# Patient Record
Sex: MALE | Race: WHITE | URBAN - METROPOLITAN AREA
[De-identification: names, ages, dates, MRNs, and addresses within clinical notes are randomized per-mention and may not be internally consistent; named-entity substitution may affect disease eponyms.]

---

## 2017-07-03 ENCOUNTER — APPOINTMENT (RX ONLY)
Dept: URBAN - METROPOLITAN AREA CLINIC 142 | Facility: CLINIC | Age: 82
Setting detail: DERMATOLOGY
End: 2017-07-03

## 2017-07-03 DIAGNOSIS — D18.0 HEMANGIOMA: ICD-10-CM

## 2017-07-03 DIAGNOSIS — L85.3 XEROSIS CUTIS: ICD-10-CM

## 2017-07-03 DIAGNOSIS — L57.0 ACTINIC KERATOSIS: ICD-10-CM

## 2017-07-03 DIAGNOSIS — L72.0 EPIDERMAL CYST: ICD-10-CM

## 2017-07-03 DIAGNOSIS — L82.1 OTHER SEBORRHEIC KERATOSIS: ICD-10-CM

## 2017-07-03 DIAGNOSIS — L57.8 OTHER SKIN CHANGES DUE TO CHRONIC EXPOSURE TO NONIONIZING RADIATION: ICD-10-CM

## 2017-07-03 DIAGNOSIS — Z85.828 PERSONAL HISTORY OF OTHER MALIGNANT NEOPLASM OF SKIN: ICD-10-CM

## 2017-07-03 DIAGNOSIS — D22 MELANOCYTIC NEVI: ICD-10-CM

## 2017-07-03 PROBLEM — D18.01 HEMANGIOMA OF SKIN AND SUBCUTANEOUS TISSUE: Status: ACTIVE | Noted: 2017-07-03

## 2017-07-03 PROBLEM — E78.5 HYPERLIPIDEMIA, UNSPECIFIED: Status: ACTIVE | Noted: 2017-07-03

## 2017-07-03 PROBLEM — I10 ESSENTIAL (PRIMARY) HYPERTENSION: Status: ACTIVE | Noted: 2017-07-03

## 2017-07-03 PROBLEM — D22.5 MELANOCYTIC NEVI OF TRUNK: Status: ACTIVE | Noted: 2017-07-03

## 2017-07-03 PROCEDURE — 17004 DESTROY PREMAL LESIONS 15/>: CPT

## 2017-07-03 PROCEDURE — 99203 OFFICE O/P NEW LOW 30 MIN: CPT | Mod: 25

## 2017-07-03 PROCEDURE — ? LIQUID NITROGEN

## 2017-07-03 PROCEDURE — ? COUNSELING

## 2017-07-03 PROCEDURE — ? DEFER

## 2017-07-03 ASSESSMENT — LOCATION SIMPLE DESCRIPTION DERM
LOCATION SIMPLE: RIGHT PRETIBIAL REGION
LOCATION SIMPLE: UPPER BACK
LOCATION SIMPLE: LEFT CHEEK
LOCATION SIMPLE: LEFT UPPER BACK
LOCATION SIMPLE: RIGHT EAR
LOCATION SIMPLE: SCALP
LOCATION SIMPLE: RIGHT CHEEK

## 2017-07-03 ASSESSMENT — LOCATION DETAILED DESCRIPTION DERM
LOCATION DETAILED: RIGHT SUPERIOR CRUS OF ANTIHELIX
LOCATION DETAILED: RIGHT PROXIMAL PRETIBIAL REGION
LOCATION DETAILED: LEFT MID-UPPER BACK
LOCATION DETAILED: RIGHT CENTRAL MALAR CHEEK
LOCATION DETAILED: SUPERIOR THORACIC SPINE
LOCATION DETAILED: LEFT SUPERIOR UPPER BACK
LOCATION DETAILED: LEFT SUPERIOR PARIETAL SCALP
LOCATION DETAILED: LEFT LATERAL MALAR CHEEK

## 2017-07-03 ASSESSMENT — LOCATION ZONE DERM
LOCATION ZONE: FACE
LOCATION ZONE: LEG
LOCATION ZONE: SCALP
LOCATION ZONE: TRUNK
LOCATION ZONE: EAR

## 2017-07-03 ASSESSMENT — PAIN INTENSITY VAS
HOW INTENSE IS YOUR PAIN 0 BEING NO PAIN, 10 BEING THE MOST SEVERE PAIN POSSIBLE?: 1/10 PAIN
HOW INTENSE IS YOUR PAIN 0 BEING NO PAIN, 10 BEING THE MOST SEVERE PAIN POSSIBLE?: NO PAIN

## 2017-07-03 NOTE — PROCEDURE: LIQUID NITROGEN
Number Of Freeze-Thaw Cycles: 2 freeze-thaw cycles
Consent: The patient's verbal consent was obtained including but not limited to risks of crusting, scabbing, blistering, scarring, darker or lighter pigmentary change, recurrence, incomplete removal and infection.
Render Post-Care Instructions In Note?: no
Total Number Of Aks Treated: 20
Duration Of Freeze Thaw-Cycle (Seconds): 5
Detail Level: Zone
Post-Care Instructions: I reviewed with the patient in detail post-care instructions. Patient is to wear sunprotection, and avoid picking at any of the treated lesions. Pt may apply Vaseline to crusted or scabbing areas.

## 2017-08-14 ENCOUNTER — APPOINTMENT (RX ONLY)
Dept: URBAN - METROPOLITAN AREA CLINIC 142 | Facility: CLINIC | Age: 82
Setting detail: DERMATOLOGY
End: 2017-08-14

## 2017-08-14 VITALS
DIASTOLIC BLOOD PRESSURE: 73 MMHG | HEIGHT: 76 IN | HEART RATE: 72 BPM | SYSTOLIC BLOOD PRESSURE: 143 MMHG | WEIGHT: 165 LBS

## 2017-08-14 DIAGNOSIS — L72.0 EPIDERMAL CYST: ICD-10-CM

## 2017-08-14 PROBLEM — D48.5 NEOPLASM OF UNCERTAIN BEHAVIOR OF SKIN: Status: ACTIVE | Noted: 2017-08-14

## 2017-08-14 PROCEDURE — ? EXCISION

## 2017-08-14 PROCEDURE — 11402 EXC TR-EXT B9+MARG 1.1-2 CM: CPT

## 2017-08-14 PROCEDURE — 13101 CMPLX RPR TRUNK 2.6-7.5 CM: CPT

## 2017-08-14 ASSESSMENT — LOCATION DETAILED DESCRIPTION DERM: LOCATION DETAILED: SUPERIOR THORACIC SPINE

## 2017-08-14 ASSESSMENT — LOCATION ZONE DERM: LOCATION ZONE: TRUNK

## 2017-08-14 ASSESSMENT — LOCATION SIMPLE DESCRIPTION DERM: LOCATION SIMPLE: UPPER BACK

## 2017-08-14 NOTE — PROCEDURE: EXCISION
Skin Substitute Units (Will Override Primary Defect Units If Greater Than 0): 0
Paramedian Forehead Flap Text: A decision was made to reconstruct the defect utilizing an interpolation axial flap and a staged reconstruction.  A telfa template was made of the defect.  This telfa template was then used to outline the paramedian forehead pedicle flap.  The donor area for the pedicle flap was then injected with anesthesia.  The flap was excised through the skin and subcutaneous tissue down to the layer of the underlying musculature.  The pedicle flap was carefully excised within this deep plane to maintain its blood supply.  The edges of the donor site were undermined.   The donor site was closed in a primary fashion.  The pedicle was then rotated into position and sutured.  Once the tube was sutured into place, adequate blood supply was confirmed with blanching and refill.  The pedicle was then wrapped with xeroform gauze and dressed appropriately with a telfa and gauze bandage to ensure continued blood supply and protect the attached pedicle.
Island Pedicle Flap With Canthal Suspension Text: The defect edges were debeveled with a #15 scalpel blade.  Given the location of the defect, shape of the defect and the proximity to free margins an island pedicle advancement flap was deemed most appropriate.  Using a sterile surgical marker, an appropriate advancement flap was drawn incorporating the defect, outlining the appropriate donor tissue and placing the expected incisions within the relaxed skin tension lines where possible. The area thus outlined was incised deep to adipose tissue with a #15 scalpel blade.  The skin margins were undermined to an appropriate distance in all directions around the primary defect and laterally outward around the island pedicle utilizing iris scissors.  There was minimal undermining beneath the pedicle flap. A suspension suture was placed in the canthal tendon to prevent tension and prevent ectropion.
X Size Of Lesion In Cm (Optional): 1.8
Bi-Rhombic Flap Text: The defect edges were debeveled with a #15 scalpel blade.  Given the location of the defect and the proximity to free margins a bi-rhombic flap was deemed most appropriate.  Using a sterile surgical marker, an appropriate rhombic flap was drawn incorporating the defect. The area thus outlined was incised deep to adipose tissue with a #15 scalpel blade.  The skin margins were undermined to an appropriate distance in all directions utilizing iris scissors.
Deep Sutures: 3-0 Vicryl
Dermal Autograft Text: The defect edges were debeveled with a #15 scalpel blade.  Given the location of the defect, shape of the defect and the proximity to free margins a dermal autograft was deemed most appropriate.  Using a sterile surgical marker, the primary defect shape was transferred to the donor site. The area thus outlined was incised deep to adipose tissue with a #15 scalpel blade.  The harvested graft was then trimmed of adipose and epidermal tissue until only dermis was left.  The skin graft was then placed in the primary defect and oriented appropriately.
Eliptical Excision Additional Text (Leave Blank If You Do Not Want): The margin was drawn around the clinically apparent lesion.  An elliptical shape was then drawn on the skin incorporating the lesion and margins.  Incisions were then made along these lines to the appropriate tissue plane and the lesion was extirpated.
Advancement Flap (Double) Text: The defect edges were debeveled with a #15 scalpel blade.  Given the location of the defect and the proximity to free margins a double advancement flap was deemed most appropriate.  Using a sterile surgical marker, the appropriate advancement flaps were drawn incorporating the defect and placing the expected incisions within the relaxed skin tension lines where possible.    The area thus outlined was incised deep to adipose tissue with a #15 scalpel blade.  The skin margins were undermined to an appropriate distance in all directions utilizing iris scissors.
Complex Repair And Rhombic Flap Text: The defect edges were debeveled with a #15 scalpel blade.  The primary defect was closed partially with a complex linear closure.  Given the location of the remaining defect, shape of the defect and the proximity to free margins a rhombic flap was deemed most appropriate for complete closure of the defect.  Using a sterile surgical marker, an appropriate advancement flap was drawn incorporating the defect and placing the expected incisions within the relaxed skin tension lines where possible.    The area thus outlined was incised deep to adipose tissue with a #15 scalpel blade.  The skin margins were undermined to an appropriate distance in all directions utilizing iris scissors.
Include Z78.9 (Other Specified Conditions Influencing Health Status) As An Associated Diagnosis?: No
Complex Repair And Modified Advancement Flap Text: The defect edges were debeveled with a #15 scalpel blade.  The primary defect was closed partially with a complex linear closure.  Given the location of the remaining defect, shape of the defect and the proximity to free margins a modified advancement flap was deemed most appropriate for complete closure of the defect.  Using a sterile surgical marker, an appropriate advancement flap was drawn incorporating the defect and placing the expected incisions within the relaxed skin tension lines where possible.    The area thus outlined was incised deep to adipose tissue with a #15 scalpel blade.  The skin margins were undermined to an appropriate distance in all directions utilizing iris scissors.
Hatchet Flap Text: The defect edges were debeveled with a #15 scalpel blade.  Given the location of the defect, shape of the defect and the proximity to free margins a hatchet flap was deemed most appropriate.  Using a sterile surgical marker, an appropriate hatchet flap was drawn incorporating the defect and placing the expected incisions within the relaxed skin tension lines where possible.    The area thus outlined was incised deep to adipose tissue with a #15 scalpel blade.  The skin margins were undermined to an appropriate distance in all directions utilizing iris scissors.
Intermediate Repair Preamble Text (Leave Blank If You Do Not Want): Undermining was performed with blunt dissection.
Complex Repair And Melolabial Flap Text: The defect edges were debeveled with a #15 scalpel blade.  The primary defect was closed partially with a complex linear closure.  Given the location of the remaining defect, shape of the defect and the proximity to free margins a melolabial flap was deemed most appropriate for complete closure of the defect.  Using a sterile surgical marker, an appropriate advancement flap was drawn incorporating the defect and placing the expected incisions within the relaxed skin tension lines where possible.    The area thus outlined was incised deep to adipose tissue with a #15 scalpel blade.  The skin margins were undermined to an appropriate distance in all directions utilizing iris scissors.
Billing Type: Third-Party Bill
S Plasty Text: Given the location and shape of the defect, and the orientation of relaxed skin tension lines, an S-plasty was deemed most appropriate for repair.  Using a sterile surgical marker, the appropriate outline of the S-plasty was drawn, incorporating the defect and placing the expected incisions within the relaxed skin tension lines where possible.  The area thus outlined was incised deep to adipose tissue with a #15 scalpel blade.  The skin margins were undermined to an appropriate distance in all directions utilizing iris scissors. The skin flaps were advanced over the defect.  The opposing margins were then approximated with interrupted buried subcutaneous sutures.
Biopsy Photograph Reviewed: No (no photograph available)
Estimated Blood Loss (Cc): minimal
O-T Advancement Flap Text: The defect edges were debeveled with a #15 scalpel blade.  Given the location of the defect, shape of the defect and the proximity to free margins an O-T advancement flap was deemed most appropriate.  Using a sterile surgical marker, an appropriate advancement flap was drawn incorporating the defect and placing the expected incisions within the relaxed skin tension lines where possible.    The area thus outlined was incised deep to adipose tissue with a #15 scalpel blade.  The skin margins were undermined to an appropriate distance in all directions utilizing iris scissors.
Purse String (Intermediate) Text: Given the location of the defect and the characteristics of the surrounding skin a pursestring intermediate closure was deemed most appropriate.  Undermining was performed circumfirentially around the surgical defect.  A purstring suture was then placed and tightened.
A-T Advancement Flap Text: The defect edges were debeveled with a #15 scalpel blade.  Given the location of the defect, shape of the defect and the proximity to free margins an A-T advancement flap was deemed most appropriate.  Using a sterile surgical marker, an appropriate advancement flap was drawn incorporating the defect and placing the expected incisions within the relaxed skin tension lines where possible.    The area thus outlined was incised deep to adipose tissue with a #15 scalpel blade.  The skin margins were undermined to an appropriate distance in all directions utilizing iris scissors.
Complex Repair And Rotation Flap Text: The defect edges were debeveled with a #15 scalpel blade.  The primary defect was closed partially with a complex linear closure.  Given the location of the remaining defect, shape of the defect and the proximity to free margins a rotation flap was deemed most appropriate for complete closure of the defect.  Using a sterile surgical marker, an appropriate advancement flap was drawn incorporating the defect and placing the expected incisions within the relaxed skin tension lines where possible.    The area thus outlined was incised deep to adipose tissue with a #15 scalpel blade.  The skin margins were undermined to an appropriate distance in all directions utilizing iris scissors.
Ftsg Text: The defect edges were debeveled with a #15 scalpel blade.  Given the location of the defect, shape of the defect and the proximity to free margins a full thickness skin graft was deemed most appropriate.  Using a sterile surgical marker, the primary defect shape was transferred to the donor site. The area thus outlined was incised deep to adipose tissue with a #15 scalpel blade.  The harvested graft was then trimmed of adipose tissue until only dermis and epidermis was left.  The skin margins of the secondary defect were undermined to an appropriate distance in all directions utilizing iris scissors.  The secondary defect was closed with interrupted buried subcutaneous sutures.  The skin edges were then re-apposed with running  sutures.  The skin graft was then placed in the primary defect and oriented appropriately.
Consent was obtained from the patient. The risks and benefits to therapy were discussed in detail. Specifically, the risks of infection, scarring, bleeding, prolonged wound healing, incomplete removal, allergy to anesthesia, nerve injury and recurrence were addressed. Prior to the procedure, the treatment site was clearly identified and confirmed by the patient. All components of Universal Protocol/PAUSE Rule completed.
Z Plasty Text: The lesion was extirpated to the level of the fat with a #15 scalpel blade.  Given the location of the defect, shape of the defect and the proximity to free margins a Z-plasty was deemed most appropriate for repair.  Using a sterile surgical marker, the appropriate transposition arms of the Z-plasty were drawn incorporating the defect and placing the expected incisions within the relaxed skin tension lines where possible.    The area thus outlined was incised deep to adipose tissue with a #15 scalpel blade.  The skin margins were undermined to an appropriate distance in all directions utilizing iris scissors.  The opposing transposition arms were then transposed into place in opposite direction and anchored with interrupted buried subcutaneous sutures.
Lab: [id number]
Intermediate / Complex Repair - Final Wound Length In Cm: 3
Burow's Advancement Flap Text: The defect edges were debeveled with a #15 scalpel blade.  Given the location of the defect and the proximity to free margins a Burow's advancement flap was deemed most appropriate.  Using a sterile surgical marker, the appropriate advancement flap was drawn incorporating the defect and placing the expected incisions within the relaxed skin tension lines where possible.    The area thus outlined was incised deep to adipose tissue with a #15 scalpel blade.  The skin margins were undermined to an appropriate distance in all directions utilizing iris scissors.
Mucosal Advancement Flap Text: Given the location of the defect, shape of the defect and the proximity to free margins a mucosal advancement flap was deemed most appropriate. Incisions were made with a 15 blade scalpel in the appropriate fashion along the cutaneous vermillion border and the mucosal lip. The remaining actinically damaged mucosal tissue was excised.  The mucosal advancement flap was then elevated to the gingival sulcus with care taken to preserve the neurovascular structures and advanced into the primary defect. Care was taken to ensure that precise realignment of the vermilion border was achieved.
Posterior Auricular Interpolation Flap Text: A decision was made to reconstruct the defect utilizing an interpolation axial flap and a staged reconstruction.  A telfa template was made of the defect.  This telfa template was then used to outline the posterior auricular interpolation flap.  The donor area for the pedicle flap was then injected with anesthesia.  The flap was excised through the skin and subcutaneous tissue down to the layer of the underlying musculature.  The pedicle flap was carefully excised within this deep plane to maintain its blood supply.  The edges of the donor site were undermined.   The donor site was closed in a primary fashion.  The pedicle was then rotated into position and sutured.  Once the tube was sutured into place, adequate blood supply was confirmed with blanching and refill.  The pedicle was then wrapped with xeroform gauze and dressed appropriately with a telfa and gauze bandage to ensure continued blood supply and protect the attached pedicle.
Complex Repair And Skin Substitute Graft Text: The defect edges were debeveled with a #15 scalpel blade.  The primary defect was closed partially with a complex linear closure.  Given the location of the remaining defect, shape of the defect and the proximity to free margins a skin substitute graft was deemed most appropriate to repair the remaining defect.  The graft was trimmed to fit the size of the remaining defect.  The graft was then placed in the primary defect, oriented appropriately, and sutured into place.
Star Wedge Flap Text: The defect edges were debeveled with a #15 scalpel blade.  Given the location of the defect, shape of the defect and the proximity to free margins a star wedge flap was deemed most appropriate.  Using a sterile surgical marker, an appropriate rotation flap was drawn incorporating the defect and placing the expected incisions within the relaxed skin tension lines where possible. The area thus outlined was incised deep to adipose tissue with a #15 scalpel blade.  The skin margins were undermined to an appropriate distance in all directions utilizing iris scissors.
Bilobed Flap Text: The defect edges were debeveled with a #15 scalpel blade.  Given the location of the defect and the proximity to free margins a bilobe flap was deemed most appropriate.  Using a sterile surgical marker, an appropriate bilobe flap drawn around the defect.    The area thus outlined was incised deep to adipose tissue with a #15 scalpel blade.  The skin margins were undermined to an appropriate distance in all directions utilizing iris scissors.
Lab Facility: [id number]
Medical Necessity Clause: This procedure was medically necessary because the lesion that was treated was:
Fusiform Excision Additional Text (Leave Blank If You Do Not Want): The margin was drawn around the clinically apparent lesion.  A fusiform shape was then drawn on the skin incorporating the lesion and margins.  Incisions were then made along these lines to the appropriate tissue plane and the lesion was extirpated.
Skin Substitute Text: The defect edges were debeveled with a #15 scalpel blade.  Given the location of the defect, shape of the defect and the proximity to free margins a skin substitute graft was deemed most appropriate.  The graft material was trimmed to fit the size of the defect. The graft was then placed in the primary defect and oriented appropriately.
Xenograft Text: The defect edges were debeveled with a #15 scalpel blade.  Given the location of the defect, shape of the defect and the proximity to free margins a xenograft was deemed most appropriate.  The graft was then trimmed to fit the size of the defect.  The graft was then placed in the primary defect and oriented appropriately.
Anesthesia Type: 1% lidocaine without epinephrine
Anesthesia Volume In Cc: 5
Modified Advancement Flap Text: The defect edges were debeveled with a #15 scalpel blade.  Given the location of the defect, shape of the defect and the proximity to free margins a modified advancement flap was deemed most appropriate.  Using a sterile surgical marker, an appropriate advancement flap was drawn incorporating the defect and placing the expected incisions within the relaxed skin tension lines where possible.    The area thus outlined was incised deep to adipose tissue with a #15 scalpel blade.  The skin margins were undermined to an appropriate distance in all directions utilizing iris scissors.
Island Pedicle Flap Text: The defect edges were debeveled with a #15 scalpel blade.  Given the location of the defect, shape of the defect and the proximity to free margins an island pedicle advancement flap was deemed most appropriate.  Using a sterile surgical marker, an appropriate advancement flap was drawn incorporating the defect, outlining the appropriate donor tissue and placing the expected incisions within the relaxed skin tension lines where possible.    The area thus outlined was incised deep to adipose tissue with a #15 scalpel blade.  The skin margins were undermined to an appropriate distance in all directions around the primary defect and laterally outward around the island pedicle utilizing iris scissors.  There was minimal undermining beneath the pedicle flap.
Render Post-Care Instructions In Note?: yes
Medical Necessity Clause: The excision was medically necessary because the lesion which was excised was:
Complex Repair And Z Plasty Text: The defect edges were debeveled with a #15 scalpel blade.  The primary defect was closed partially with a complex linear closure.  Given the location of the remaining defect, shape of the defect and the proximity to free margins a Z plasty was deemed most appropriate for complete closure of the defect.  Using a sterile surgical marker, an appropriate advancement flap was drawn incorporating the defect and placing the expected incisions within the relaxed skin tension lines where possible.    The area thus outlined was incised deep to adipose tissue with a #15 scalpel blade.  The skin margins were undermined to an appropriate distance in all directions utilizing iris scissors.
Split-Thickness Skin Graft Text: The defect edges were debeveled with a #15 scalpel blade.  Given the location of the defect, shape of the defect and the proximity to free margins a split thickness skin graft was deemed most appropriate.  Using a sterile surgical marker, the primary defect shape was transferred to the donor site. The split thickness graft was then harvested.  The skin graft was then placed in the primary defect and oriented appropriately.
Perilesional Excision Additional Text (Leave Blank If You Do Not Want): The margin was drawn around the clinically apparent lesion. Incisions were then made along these lines to the appropriate tissue plane and the lesion was extirpated.
V-Y Plasty Text: The defect edges were debeveled with a #15 scalpel blade.  Given the location of the defect, shape of the defect and the proximity to free margins an V-Y advancement flap was deemed most appropriate.  Using a sterile surgical marker, an appropriate advancement flap was drawn incorporating the defect and placing the expected incisions within the relaxed skin tension lines where possible.    The area thus outlined was incised deep to adipose tissue with a #15 scalpel blade.  The skin margins were undermined to an appropriate distance in all directions utilizing iris scissors.
Island Pedicle Flap-Requiring Vessel Identification Text: The defect edges were debeveled with a #15 scalpel blade.  Given the location of the defect, shape of the defect and the proximity to free margins an island pedicle advancement flap was deemed most appropriate.  Using a sterile surgical marker, an appropriate advancement flap was drawn, based on the axial vessel mentioned above, incorporating the defect, outlining the appropriate donor tissue and placing the expected incisions within the relaxed skin tension lines where possible.    The area thus outlined was incised deep to adipose tissue with a #15 scalpel blade.  The skin margins were undermined to an appropriate distance in all directions around the primary defect and laterally outward around the island pedicle utilizing iris scissors.  There was minimal undermining beneath the pedicle flap.
Spiral Flap Text: The defect edges were debeveled with a #15 scalpel blade.  Given the location of the defect, shape of the defect and the proximity to free margins a spiral flap was deemed most appropriate.  Using a sterile surgical marker, an appropriate rotation flap was drawn incorporating the defect and placing the expected incisions within the relaxed skin tension lines where possible. The area thus outlined was incised deep to adipose tissue with a #15 scalpel blade.  The skin margins were undermined to an appropriate distance in all directions utilizing iris scissors.
Size Of Lesion In Cm: 2
Complex Repair And Dorsal Nasal Flap Text: The defect edges were debeveled with a #15 scalpel blade.  The primary defect was closed partially with a complex linear closure.  Given the location of the remaining defect, shape of the defect and the proximity to free margins a dorsal nasal flap was deemed most appropriate for complete closure of the defect.  Using a sterile surgical marker, an appropriate flap was drawn incorporating the defect and placing the expected incisions within the relaxed skin tension lines where possible.    The area thus outlined was incised deep to adipose tissue with a #15 scalpel blade.  The skin margins were undermined to an appropriate distance in all directions utilizing iris scissors.
Tissue Cultured Epidermal Autograft Text: The defect edges were debeveled with a #15 scalpel blade.  Given the location of the defect, shape of the defect and the proximity to free margins a tissue cultured epidermal autograft was deemed most appropriate.  The graft was then trimmed to fit the size of the defect.  The graft was then placed in the primary defect and oriented appropriately.
Complex Repair And A-T Advancement Flap Text: The defect edges were debeveled with a #15 scalpel blade.  The primary defect was closed partially with a complex linear closure.  Given the location of the remaining defect, shape of the defect and the proximity to free margins an A-T advancement flap was deemed most appropriate for complete closure of the defect.  Using a sterile surgical marker, an appropriate advancement flap was drawn incorporating the defect and placing the expected incisions within the relaxed skin tension lines where possible.    The area thus outlined was incised deep to adipose tissue with a #15 scalpel blade.  The skin margins were undermined to an appropriate distance in all directions utilizing iris scissors.
Complex Repair And M Plasty Text: The defect edges were debeveled with a #15 scalpel blade.  The primary defect was closed partially with a complex linear closure.  Given the location of the remaining defect, shape of the defect and the proximity to free margins an M plasty was deemed most appropriate for complete closure of the defect.  Using a sterile surgical marker, an appropriate advancement flap was drawn incorporating the defect and placing the expected incisions within the relaxed skin tension lines where possible.    The area thus outlined was incised deep to adipose tissue with a #15 scalpel blade.  The skin margins were undermined to an appropriate distance in all directions utilizing iris scissors.
Crescentic Advancement Flap Text: The defect edges were debeveled with a #15 scalpel blade.  Given the location of the defect and the proximity to free margins a crescentic advancement flap was deemed most appropriate.  Using a sterile surgical marker, the appropriate advancement flap was drawn incorporating the defect and placing the expected incisions within the relaxed skin tension lines where possible.    The area thus outlined was incised deep to adipose tissue with a #15 scalpel blade.  The skin margins were undermined to an appropriate distance in all directions utilizing iris scissors.
Melolabial Transposition Flap Text: The defect edges were debeveled with a #15 scalpel blade.  Given the location of the defect and the proximity to free margins a melolabial flap was deemed most appropriate.  Using a sterile surgical marker, an appropriate melolabial transposition flap was drawn incorporating the defect.    The area thus outlined was incised deep to adipose tissue with a #15 scalpel blade.  The skin margins were undermined to an appropriate distance in all directions utilizing iris scissors.
O-T Plasty Text: The defect edges were debeveled with a #15 scalpel blade.  Given the location of the defect, shape of the defect and the proximity to free margins an O-T plasty was deemed most appropriate.  Using a sterile surgical marker, an appropriate O-T plasty was drawn incorporating the defect and placing the expected incisions within the relaxed skin tension lines where possible.    The area thus outlined was incised deep to adipose tissue with a #15 scalpel blade.  The skin margins were undermined to an appropriate distance in all directions utilizing iris scissors.
Slit Excision Additional Text (Leave Blank If You Do Not Want): A linear line was drawn on the skin overlying the lesion. An incision was made slowly until the lesion was visualized.  Once visualized, the lesion was removed with blunt dissection.
W Plasty Text: The lesion was extirpated to the level of the fat with a #15 scalpel blade.  Given the location of the defect, shape of the defect and the proximity to free margins a W-plasty was deemed most appropriate for repair.  Using a sterile surgical marker, the appropriate transposition arms of the W-plasty were drawn incorporating the defect and placing the expected incisions within the relaxed skin tension lines where possible.    The area thus outlined was incised deep to adipose tissue with a #15 scalpel blade.  The skin margins were undermined to an appropriate distance in all directions utilizing iris scissors.  The opposing transposition arms were then transposed into place in opposite direction and anchored with interrupted buried subcutaneous sutures.
Medical Necessity Information: It is in your best interest to select a reason for this procedure from the list below. All of these items fulfill various CMS LCD requirements except lesion extends to a margin.
Complex Repair And Tissue Cultured Epidermal Autograft Text: The defect edges were debeveled with a #15 scalpel blade.  The primary defect was closed partially with a complex linear closure.  Given the location of the defect, shape of the defect and the proximity to free margins an tissue cultured epidermal autograft was deemed most appropriate to repair the remaining defect.  The graft was trimmed to fit the size of the remaining defect.  The graft was then placed in the primary defect, oriented appropriately, and sutured into place.
O-L Flap Text: The defect edges were debeveled with a #15 scalpel blade.  Given the location of the defect, shape of the defect and the proximity to free margins an O-L flap was deemed most appropriate.  Using a sterile surgical marker, an appropriate advancement flap was drawn incorporating the defect and placing the expected incisions within the relaxed skin tension lines where possible.    The area thus outlined was incised deep to adipose tissue with a #15 scalpel blade.  The skin margins were undermined to an appropriate distance in all directions utilizing iris scissors.
Complex Repair And O-L Flap Text: The defect edges were debeveled with a #15 scalpel blade.  The primary defect was closed partially with a complex linear closure.  Given the location of the remaining defect, shape of the defect and the proximity to free margins an O-L flap was deemed most appropriate for complete closure of the defect.  Using a sterile surgical marker, an appropriate flap was drawn incorporating the defect and placing the expected incisions within the relaxed skin tension lines where possible.    The area thus outlined was incised deep to adipose tissue with a #15 scalpel blade.  The skin margins were undermined to an appropriate distance in all directions utilizing iris scissors.
Bilobed Transposition Flap Text: The defect edges were debeveled with a #15 scalpel blade.  Given the location of the defect and the proximity to free margins a bilobed transposition flap was deemed most appropriate.  Using a sterile surgical marker, an appropriate bilobe flap drawn around the defect.    The area thus outlined was incised deep to adipose tissue with a #15 scalpel blade.  The skin margins were undermined to an appropriate distance in all directions utilizing iris scissors.
Detail Level: Detailed
Interpolation Flap Text: A decision was made to reconstruct the defect utilizing an interpolation axial flap and a staged reconstruction.  A telfa template was made of the defect.  This telfa template was then used to outline the interpolation flap.  The donor area for the pedicle flap was then injected with anesthesia.  The flap was excised through the skin and subcutaneous tissue down to the layer of the underlying musculature.  The interpolation flap was carefully excised within this deep plane to maintain its blood supply.  The edges of the donor site were undermined.   The donor site was closed in a primary fashion.  The pedicle was then rotated into position and sutured.  Once the tube was sutured into place, adequate blood supply was confirmed with blanching and refill.  The pedicle was then wrapped with xeroform gauze and dressed appropriately with a telfa and gauze bandage to ensure continued blood supply and protect the attached pedicle.
Complex Repair And Xenograft Text: The defect edges were debeveled with a #15 scalpel blade.  The primary defect was closed partially with a complex linear closure.  Given the location of the defect, shape of the defect and the proximity to free margins a xenograft was deemed most appropriate to repair the remaining defect.  The graft was trimmed to fit the size of the remaining defect.  The graft was then placed in the primary defect, oriented appropriately, and sutured into place.
Mastoid Interpolation Flap Text: A decision was made to reconstruct the defect utilizing an interpolation axial flap and a staged reconstruction.  A telfa template was made of the defect.  This telfa template was then used to outline the mastoid interpolation flap.  The donor area for the pedicle flap was then injected with anesthesia.  The flap was excised through the skin and subcutaneous tissue down to the layer of the underlying musculature.  The pedicle flap was carefully excised within this deep plane to maintain its blood supply.  The edges of the donor site were undermined.   The donor site was closed in a primary fashion.  The pedicle was then rotated into position and sutured.  Once the tube was sutured into place, adequate blood supply was confirmed with blanching and refill.  The pedicle was then wrapped with xeroform gauze and dressed appropriately with a telfa and gauze bandage to ensure continued blood supply and protect the attached pedicle.
Epidermal Sutures: 5-0 Plain Gut
Epidermal Autograft Text: The defect edges were debeveled with a #15 scalpel blade.  Given the location of the defect, shape of the defect and the proximity to free margins an epidermal autograft was deemed most appropriate.  Using a sterile surgical marker, the primary defect shape was transferred to the donor site. The epidermal graft was then harvested.  The skin graft was then placed in the primary defect and oriented appropriately.
Complex Repair And Split-Thickness Skin Graft Text: The defect edges were debeveled with a #15 scalpel blade.  The primary defect was closed partially with a complex linear closure.  Given the location of the defect, shape of the defect and the proximity to free margins a split thickness skin graft was deemed most appropriate to repair the remaining defect.  The graft was trimmed to fit the size of the remaining defect.  The graft was then placed in the primary defect, oriented appropriately, and sutured into place.
O-Z Plasty Text: The defect edges were debeveled with a #15 scalpel blade.  Given the location of the defect, shape of the defect and the proximity to free margins an O-Z plasty (double transposition flap) was deemed most appropriate.  Using a sterile surgical marker, the appropriate transposition flaps were drawn incorporating the defect and placing the expected incisions within the relaxed skin tension lines where possible.    The area thus outlined was incised deep to adipose tissue with a #15 scalpel blade.  The skin margins were undermined to an appropriate distance in all directions utilizing iris scissors.  Hemostasis was achieved with electrocautery.  The flaps were then transposed into place, one clockwise and the other counterclockwise, and anchored with interrupted buried subcutaneous sutures.
Complex Repair And Ftsg Text: The defect edges were debeveled with a #15 scalpel blade.  The primary defect was closed partially with a complex linear closure.  Given the location of the defect, shape of the defect and the proximity to free margins a full thickness skin graft was deemed most appropriate to repair the remaining defect.  The graft was trimmed to fit the size of the remaining defect.  The graft was then placed in the primary defect, oriented appropriately, and sutured into place.
Complex Repair And O-T Advancement Flap Text: The defect edges were debeveled with a #15 scalpel blade.  The primary defect was closed partially with a complex linear closure.  Given the location of the remaining defect, shape of the defect and the proximity to free margins an O-T advancement flap was deemed most appropriate for complete closure of the defect.  Using a sterile surgical marker, an appropriate advancement flap was drawn incorporating the defect and placing the expected incisions within the relaxed skin tension lines where possible.    The area thus outlined was incised deep to adipose tissue with a #15 scalpel blade.  The skin margins were undermined to an appropriate distance in all directions utilizing iris scissors.
Cartilage Graft Text: The defect edges were debeveled with a #15 scalpel blade.  Given the location of the defect, shape of the defect, the fact the defect involved a full thickness cartilage defect a cartilage graft was deemed most appropriate.  An appropriate donor site was identified, cleansed, and anesthetized. The cartilage graft was then harvested and transferred to the recipient site, oriented appropriately and then sutured into place.  The secondary defect was then repaired using a primary closure.
Cheek-To-Nose Interpolation Flap Text: A decision was made to reconstruct the defect utilizing an interpolation axial flap and a staged reconstruction.  A telfa template was made of the defect.  This telfa template was then used to outline the Cheek-To-Nose Interpolation flap.  The donor area for the pedicle flap was then injected with anesthesia.  The flap was excised through the skin and subcutaneous tissue down to the layer of the underlying musculature.  The interpolation flap was carefully excised within this deep plane to maintain its blood supply.  The edges of the donor site were undermined.   The donor site was closed in a primary fashion.  The pedicle was then rotated into position and sutured.  Once the tube was sutured into place, adequate blood supply was confirmed with blanching and refill.  The pedicle was then wrapped with xeroform gauze and dressed appropriately with a telfa and gauze bandage to ensure continued blood supply and protect the attached pedicle.
No Repair - Repaired With Adjacent Surgical Defect Text (Leave Blank If You Do Not Want): After the excision the defect was repaired concurrently with another surgical defect which was in close approximation.
H Plasty Text: Given the location of the defect, shape of the defect and the proximity to free margins a H-plasty was deemed most appropriate for repair.  Using a sterile surgical marker, the appropriate advancement arms of the H-plasty were drawn incorporating the defect and placing the expected incisions within the relaxed skin tension lines where possible. The area thus outlined was incised deep to adipose tissue with a #15 scalpel blade. The skin margins were undermined to an appropriate distance in all directions utilizing iris scissors.  The opposing advancement arms were then advanced into place in opposite direction and anchored with interrupted buried subcutaneous sutures.
Complex Repair And V-Y Plasty Text: The defect edges were debeveled with a #15 scalpel blade.  The primary defect was closed partially with a complex linear closure.  Given the location of the remaining defect, shape of the defect and the proximity to free margins a V-Y plasty was deemed most appropriate for complete closure of the defect.  Using a sterile surgical marker, an appropriate advancement flap was drawn incorporating the defect and placing the expected incisions within the relaxed skin tension lines where possible.    The area thus outlined was incised deep to adipose tissue with a #15 scalpel blade.  The skin margins were undermined to an appropriate distance in all directions utilizing iris scissors.
Repair Type: Complex
Path Notes (To The Dermatopathologist): please check margins
Excisional Biopsy Additional Text (Leave Blank If You Do Not Want): The margin was drawn around the clinically apparent lesion. An elliptical shape was then drawn on the skin incorporating the lesion and margins.  Incisions were then made along these lines to the appropriate tissue plane and the lesion was extirpated.
Scalpel Size: 15 blade
Muscle Hinge Flap Text: The defect edges were debeveled with a #15 scalpel blade.  Given the size, depth and location of the defect and the proximity to free margins a muscle hinge flap was deemed most appropriate.  Using a sterile surgical marker, an appropriate hinge flap was drawn incorporating the defect. The area thus outlined was incised with a #15 scalpel blade.  The skin margins were undermined to an appropriate distance in all directions utilizing iris scissors.
Rhombic Flap Text: The defect edges were debeveled with a #15 scalpel blade.  Given the location of the defect and the proximity to free margins a rhombic flap was deemed most appropriate.  Using a sterile surgical marker, an appropriate rhombic flap was drawn incorporating the defect.    The area thus outlined was incised deep to adipose tissue with a #15 scalpel blade.  The skin margins were undermined to an appropriate distance in all directions utilizing iris scissors.
Hemostasis: Electrocautery
Complex Repair And Single Advancement Flap Text: The defect edges were debeveled with a #15 scalpel blade.  The primary defect was closed partially with a complex linear closure.  Given the location of the remaining defect, shape of the defect and the proximity to free margins a single advancement flap was deemed most appropriate for complete closure of the defect.  Using a sterile surgical marker, an appropriate advancement flap was drawn incorporating the defect and placing the expected incisions within the relaxed skin tension lines where possible.    The area thus outlined was incised deep to adipose tissue with a #15 scalpel blade.  The skin margins were undermined to an appropriate distance in all directions utilizing iris scissors.
Lip Wedge Excision Repair Text: Given the location of the defect and the proximity to free margins a full thickness wedge repair was deemed most appropriate.  Using a sterile surgical marker, the appropriate repair was drawn incorporating the defect and placing the expected incisions perpendicular to the vermillion border.  The vermillion border was also meticulously outlined to ensure appropriate reapproximation during the repair.  The area thus outlined was incised through and through with a #15 scalpel blade.  The muscularis and dermis were reaproximated with deep sutures following hemostasis. Care was taken to realign the vermillion border before proceeding with the superficial closure.  Once the vermillion was realigned the superfical and mucosal closure was finished.
Saucerization Excision Additional Text (Leave Blank If You Do Not Want): The margin was drawn around the clinically apparent lesion.  Incisions were then made along these lines, in a tangential fashion, to the appropriate tissue plane and the lesion was extirpated.
Complex Repair And Transposition Flap Text: The defect edges were debeveled with a #15 scalpel blade.  The primary defect was closed partially with a complex linear closure.  Given the location of the remaining defect, shape of the defect and the proximity to free margins a transposition flap was deemed most appropriate for complete closure of the defect.  Using a sterile surgical marker, an appropriate advancement flap was drawn incorporating the defect and placing the expected incisions within the relaxed skin tension lines where possible.    The area thus outlined was incised deep to adipose tissue with a #15 scalpel blade.  The skin margins were undermined to an appropriate distance in all directions utilizing iris scissors.
Excision Method: Elliptical
Complex Repair And Double M Plasty Text: The defect edges were debeveled with a #15 scalpel blade.  The primary defect was closed partially with a complex linear closure.  Given the location of the remaining defect, shape of the defect and the proximity to free margins a double M plasty was deemed most appropriate for complete closure of the defect.  Using a sterile surgical marker, an appropriate advancement flap was drawn incorporating the defect and placing the expected incisions within the relaxed skin tension lines where possible.    The area thus outlined was incised deep to adipose tissue with a #15 scalpel blade.  The skin margins were undermined to an appropriate distance in all directions utilizing iris scissors.
Complex Repair And W Plasty Text: The defect edges were debeveled with a #15 scalpel blade.  The primary defect was closed partially with a complex linear closure.  Given the location of the remaining defect, shape of the defect and the proximity to free margins a W plasty was deemed most appropriate for complete closure of the defect.  Using a sterile surgical marker, an appropriate advancement flap was drawn incorporating the defect and placing the expected incisions within the relaxed skin tension lines where possible.    The area thus outlined was incised deep to adipose tissue with a #15 scalpel blade.  The skin margins were undermined to an appropriate distance in all directions utilizing iris scissors.
Rotation Flap Text: The defect edges were debeveled with a #15 scalpel blade.  Given the location of the defect, shape of the defect and the proximity to free margins a rotation flap was deemed most appropriate.  Using a sterile surgical marker, an appropriate rotation flap was drawn incorporating the defect and placing the expected incisions within the relaxed skin tension lines where possible.    The area thus outlined was incised deep to adipose tissue with a #15 scalpel blade.  The skin margins were undermined to an appropriate distance in all directions utilizing iris scissors.
Transposition Flap Text: The defect edges were debeveled with a #15 scalpel blade.  Given the location of the defect and the proximity to free margins a transposition flap was deemed most appropriate.  Using a sterile surgical marker, an appropriate transposition flap was drawn incorporating the defect.    The area thus outlined was incised deep to adipose tissue with a #15 scalpel blade.  The skin margins were undermined to an appropriate distance in all directions utilizing iris scissors.
Helical Rim Advancement Flap Text: The defect edges were debeveled with a #15 blade scalpel.  Given the location of the defect and the proximity to free margins (helical rim) a double helical rim advancement flap was deemed most appropriate.  Using a sterile surgical marker, the appropriate advancement flaps were drawn incorporating the defect and placing the expected incisions between the helical rim and antihelix where possible.  The area thus outlined was incised through and through with a #15 scalpel blade.  With a skin hook and iris scissors, the flaps were gently and sharply undermined and freed up.
Dressing: steri-strips and pressure dressing
Double Island Pedicle Flap Text: The defect edges were debeveled with a #15 scalpel blade.  Given the location of the defect, shape of the defect and the proximity to free margins a double island pedicle advancement flap was deemed most appropriate.  Using a sterile surgical marker, an appropriate advancement flap was drawn incorporating the defect, outlining the appropriate donor tissue and placing the expected incisions within the relaxed skin tension lines where possible.    The area thus outlined was incised deep to adipose tissue with a #15 scalpel blade.  The skin margins were undermined to an appropriate distance in all directions around the primary defect and laterally outward around the island pedicle utilizing iris scissors.  There was minimal undermining beneath the pedicle flap.
Dermal Closure: simpe interrupted
Melolabial Interpolation Flap Text: A decision was made to reconstruct the defect utilizing an interpolation axial flap and a staged reconstruction.  A telfa template was made of the defect.  This telfa template was then used to outline the melolabial interpolation flap.  The donor area for the pedicle flap was then injected with anesthesia.  The flap was excised through the skin and subcutaneous tissue down to the layer of the underlying musculature.  The pedicle flap was carefully excised within this deep plane to maintain its blood supply.  The edges of the donor site were undermined.   The donor site was closed in a primary fashion.  The pedicle was then rotated into position and sutured.  Once the tube was sutured into place, adequate blood supply was confirmed with blanching and refill.  The pedicle was then wrapped with xeroform gauze and dressed appropriately with a telfa and gauze bandage to ensure continued blood supply and protect the attached pedicle.
Trilobed Flap Text: The defect edges were debeveled with a #15 scalpel blade.  Given the location of the defect and the proximity to free margins a trilobed flap was deemed most appropriate.  Using a sterile surgical marker, an appropriate trilobed flap drawn around the defect.    The area thus outlined was incised deep to adipose tissue with a #15 scalpel blade.  The skin margins were undermined to an appropriate distance in all directions utilizing iris scissors.
Complex Repair And Dermal Autograft Text: The defect edges were debeveled with a #15 scalpel blade.  The primary defect was closed partially with a complex linear closure.  Given the location of the defect, shape of the defect and the proximity to free margins an dermal autograft was deemed most appropriate to repair the remaining defect.  The graft was trimmed to fit the size of the remaining defect.  The graft was then placed in the primary defect, oriented appropriately, and sutured into place.
Excision Depth: adipose tissue
Bilateral Helical Rim Advancement Flap Text: The defect edges were debeveled with a #15 blade scalpel.  Given the location of the defect and the proximity to free margins (helical rim) a bilateral helical rim advancement flap was deemed most appropriate.  Using a sterile surgical marker, the appropriate advancement flaps were drawn incorporating the defect and placing the expected incisions between the helical rim and antihelix where possible.  The area thus outlined was incised through and through with a #15 scalpel blade.  With a skin hook and iris scissors, the flaps were gently and sharply undermined and freed up.
Cheek Interpolation Flap Text: A decision was made to reconstruct the defect utilizing an interpolation axial flap and a staged reconstruction.  A telfa template was made of the defect.  This telfa template was then used to outline the Cheek Interpolation flap.  The donor area for the pedicle flap was then injected with anesthesia.  The flap was excised through the skin and subcutaneous tissue down to the layer of the underlying musculature.  The interpolation flap was carefully excised within this deep plane to maintain its blood supply.  The edges of the donor site were undermined.   The donor site was closed in a primary fashion.  The pedicle was then rotated into position and sutured.  Once the tube was sutured into place, adequate blood supply was confirmed with blanching and refill.  The pedicle was then wrapped with xeroform gauze and dressed appropriately with a telfa and gauze bandage to ensure continued blood supply and protect the attached pedicle.
Purse String (Simple) Text: Given the location of the defect and the characteristics of the surrounding skin a purse string simple closure was deemed most appropriate.  Undermining was performed circumferentially around the surgical defect.  A purse string suture was then placed and tightened.
V-Y Flap Text: The defect edges were debeveled with a #15 scalpel blade.  Given the location of the defect, shape of the defect and the proximity to free margins a V-Y flap was deemed most appropriate.  Using a sterile surgical marker, an appropriate advancement flap was drawn incorporating the defect and placing the expected incisions within the relaxed skin tension lines where possible.    The area thus outlined was incised deep to adipose tissue with a #15 scalpel blade.  The skin margins were undermined to an appropriate distance in all directions utilizing iris scissors.
Complex Repair Preamble Text (Leave Blank If You Do Not Want): Extensive wide undermining was performed.
Complex Repair And Bilobe Flap Text: The defect edges were debeveled with a #15 scalpel blade.  The primary defect was closed partially with a complex linear closure.  Given the location of the remaining defect, shape of the defect and the proximity to free margins a bilobe flap was deemed most appropriate for complete closure of the defect.  Using a sterile surgical marker, an appropriate advancement flap was drawn incorporating the defect and placing the expected incisions within the relaxed skin tension lines where possible.    The area thus outlined was incised deep to adipose tissue with a #15 scalpel blade.  The skin margins were undermined to an appropriate distance in all directions utilizing iris scissors.
Post-Care Instructions: I reviewed with the patient in detail post-care instructions. Patient is not to engage in any heavy lifting, exercise, or swimming for the next 14 days. Should the patient develop any fevers, chills, bleeding, severe pain patient will contact the office immediately.
Complex Repair And Double Advancement Flap Text: The defect edges were debeveled with a #15 scalpel blade.  The primary defect was closed partially with a complex linear closure.  Given the location of the remaining defect, shape of the defect and the proximity to free margins a double advancement flap was deemed most appropriate for complete closure of the defect.  Using a sterile surgical marker, an appropriate advancement flap was drawn incorporating the defect and placing the expected incisions within the relaxed skin tension lines where possible.    The area thus outlined was incised deep to adipose tissue with a #15 scalpel blade.  The skin margins were undermined to an appropriate distance in all directions utilizing iris scissors.
Composite Graft Text: The defect edges were debeveled with a #15 scalpel blade.  Given the location of the defect, shape of the defect, the proximity to free margins and the fact the defect was full thickness a composite graft was deemed most appropriate.  The defect was outline and then transferred to the donor site.  A full thickness graft was then excised from the donor site. The graft was then placed in the primary defect, oriented appropriately and then sutured into place.  The secondary defect was then repaired using a primary closure.
Complex Repair And Epidermal Autograft Text: The defect edges were debeveled with a #15 scalpel blade.  The primary defect was closed partially with a complex linear closure.  Given the location of the defect, shape of the defect and the proximity to free margins an epidermal autograft was deemed most appropriate to repair the remaining defect.  The graft was trimmed to fit the size of the remaining defect.  The graft was then placed in the primary defect, oriented appropriately, and sutured into place.
Anesthesia Type: 1% lidocaine with epinephrine
Keystone Flap Text: The defect edges were debeveled with a #15 scalpel blade.  Given the location of the defect, shape of the defect a keystone flap was deemed most appropriate.  Using a sterile surgical marker, an appropriate keystone flap was drawn incorporating the defect, outlining the appropriate donor tissue and placing the expected incisions within the relaxed skin tension lines where possible. The area thus outlined was incised deep to adipose tissue with a #15 scalpel blade.  The skin margins were undermined to an appropriate distance in all directions around the primary defect and laterally outward around the flap utilizing iris scissors.
Alar Island Pedicle Flap Text: The defect edges were debeveled with a #15 scalpel blade.  Given the location of the defect, shape of the defect and the proximity to the alar rim an island pedicle advancement flap was deemed most appropriate.  Using a sterile surgical marker, an appropriate advancement flap was drawn incorporating the defect, outlining the appropriate donor tissue and placing the expected incisions within the nasal ala running parallel to the alar rim. The area thus outlined was incised with a #15 scalpel blade.  The skin margins were undermined minimally to an appropriate distance in all directions around the primary defect and laterally outward around the island pedicle utilizing iris scissors.  There was minimal undermining beneath the pedicle flap.
Advancement Flap (Single) Text: The defect edges were debeveled with a #15 scalpel blade.  Given the location of the defect and the proximity to free margins a single advancement flap was deemed most appropriate.  Using a sterile surgical marker, an appropriate advancement flap was drawn incorporating the defect and placing the expected incisions within the relaxed skin tension lines where possible.    The area thus outlined was incised deep to adipose tissue with a #15 scalpel blade.  The skin margins were undermined to an appropriate distance in all directions utilizing iris scissors.
Dorsal Nasal Flap Text: The defect edges were debeveled with a #15 scalpel blade.  Given the location of the defect and the proximity to free margins a dorsal nasal flap was deemed most appropriate.  Using a sterile surgical marker, an appropriate dorsal nasal flap was drawn around the defect.    The area thus outlined was incised deep to adipose tissue with a #15 scalpel blade.  The skin margins were undermined to an appropriate distance in all directions utilizing iris scissors.
Epidermal Closure: running
Ear Star Wedge Flap Text: The defect edges were debeveled with a #15 blade scalpel.  Given the location of the defect and the proximity to free margins (helical rim) an ear star wedge flap was deemed most appropriate.  Using a sterile surgical marker, the appropriate flap was drawn incorporating the defect and placing the expected incisions between the helical rim and antihelix where possible.  The area thus outlined was incised through and through with a #15 scalpel blade.
Repair Performed By Another Provider Text (Leave Blank If You Do Not Want): After the tissue was excised the defect was repaired by another provider.

## 2018-03-06 ENCOUNTER — APPOINTMENT (RX ONLY)
Dept: URBAN - METROPOLITAN AREA CLINIC 142 | Facility: CLINIC | Age: 83
Setting detail: DERMATOLOGY
End: 2018-03-06

## 2018-03-06 DIAGNOSIS — L82.1 OTHER SEBORRHEIC KERATOSIS: ICD-10-CM

## 2018-03-06 DIAGNOSIS — L57.0 ACTINIC KERATOSIS: ICD-10-CM

## 2018-03-06 DIAGNOSIS — Z85.828 PERSONAL HISTORY OF OTHER MALIGNANT NEOPLASM OF SKIN: ICD-10-CM

## 2018-03-06 DIAGNOSIS — L81.4 OTHER MELANIN HYPERPIGMENTATION: ICD-10-CM

## 2018-03-06 PROCEDURE — 99213 OFFICE O/P EST LOW 20 MIN: CPT | Mod: 25

## 2018-03-06 PROCEDURE — ? LIQUID NITROGEN

## 2018-03-06 PROCEDURE — 17003 DESTRUCT PREMALG LES 2-14: CPT

## 2018-03-06 PROCEDURE — 17000 DESTRUCT PREMALG LESION: CPT

## 2018-03-06 PROCEDURE — ? COUNSELING

## 2018-03-06 ASSESSMENT — LOCATION DETAILED DESCRIPTION DERM
LOCATION DETAILED: RIGHT SUPERIOR HELIX
LOCATION DETAILED: LEFT MEDIAL FOREHEAD
LOCATION DETAILED: LEFT INFERIOR LATERAL FOREHEAD
LOCATION DETAILED: LEFT ANTERIOR PROXIMAL UPPER ARM
LOCATION DETAILED: RIGHT ANTERIOR PROXIMAL UPPER ARM
LOCATION DETAILED: MID POSTERIOR NECK
LOCATION DETAILED: RIGHT MID PREAURICULAR CHEEK
LOCATION DETAILED: LEFT SUPERIOR LATERAL MALAR CHEEK

## 2018-03-06 ASSESSMENT — LOCATION SIMPLE DESCRIPTION DERM
LOCATION SIMPLE: RIGHT EAR
LOCATION SIMPLE: RIGHT UPPER ARM
LOCATION SIMPLE: LEFT FOREHEAD
LOCATION SIMPLE: RIGHT CHEEK
LOCATION SIMPLE: POSTERIOR NECK
LOCATION SIMPLE: LEFT UPPER ARM
LOCATION SIMPLE: LEFT CHEEK

## 2018-03-06 ASSESSMENT — LOCATION ZONE DERM
LOCATION ZONE: EAR
LOCATION ZONE: NECK
LOCATION ZONE: ARM
LOCATION ZONE: FACE

## 2018-03-06 NOTE — PROCEDURE: LIQUID NITROGEN
Number Of Freeze-Thaw Cycles: 2 freeze-thaw cycles
Consent: The patient's verbal consent was obtained including but not limited to risks of crusting, scabbing, blistering, scarring, darker or lighter pigmentary change, recurrence, incomplete removal and infection.
Duration Of Freeze Thaw-Cycle (Seconds): 10
Render Post-Care Instructions In Note?: no
Detail Level: Zone
Post-Care Instructions: I reviewed with the patient in detail post-care instructions. Patient is to wear sunprotection, and avoid picking at any of the treated lesions. Pt may apply Vaseline to crusted or scabbing areas.
Total Number Of Aks Treated: 4

## 2018-08-31 ENCOUNTER — APPOINTMENT (RX ONLY)
Dept: URBAN - METROPOLITAN AREA CLINIC 142 | Facility: CLINIC | Age: 83
Setting detail: DERMATOLOGY
End: 2018-08-31

## 2018-08-31 DIAGNOSIS — D69.2 OTHER NONTHROMBOCYTOPENIC PURPURA: ICD-10-CM

## 2018-08-31 DIAGNOSIS — L81.4 OTHER MELANIN HYPERPIGMENTATION: ICD-10-CM

## 2018-08-31 DIAGNOSIS — D18.0 HEMANGIOMA: ICD-10-CM

## 2018-08-31 DIAGNOSIS — L57.0 ACTINIC KERATOSIS: ICD-10-CM

## 2018-08-31 DIAGNOSIS — L82.1 OTHER SEBORRHEIC KERATOSIS: ICD-10-CM

## 2018-08-31 DIAGNOSIS — D22 MELANOCYTIC NEVI: ICD-10-CM

## 2018-08-31 PROBLEM — D18.01 HEMANGIOMA OF SKIN AND SUBCUTANEOUS TISSUE: Status: ACTIVE | Noted: 2018-08-31

## 2018-08-31 PROBLEM — D22.5 MELANOCYTIC NEVI OF TRUNK: Status: ACTIVE | Noted: 2018-08-31

## 2018-08-31 PROCEDURE — ? LIQUID NITROGEN

## 2018-08-31 PROCEDURE — 17003 DESTRUCT PREMALG LES 2-14: CPT

## 2018-08-31 PROCEDURE — 17000 DESTRUCT PREMALG LESION: CPT

## 2018-08-31 PROCEDURE — 99214 OFFICE O/P EST MOD 30 MIN: CPT | Mod: 25

## 2018-08-31 PROCEDURE — ? COUNSELING

## 2018-08-31 ASSESSMENT — LOCATION DETAILED DESCRIPTION DERM
LOCATION DETAILED: LEFT SUPERIOR LATERAL FOREHEAD
LOCATION DETAILED: RIGHT SUPERIOR FOREHEAD
LOCATION DETAILED: LEFT VENTRAL LATERAL DISTAL FOREARM
LOCATION DETAILED: LEFT MEDIAL SUPERIOR CHEST
LOCATION DETAILED: PERIUMBILICAL SKIN
LOCATION DETAILED: RIGHT SUPERIOR LATERAL LOWER BACK
LOCATION DETAILED: MID POSTERIOR NECK

## 2018-08-31 ASSESSMENT — LOCATION SIMPLE DESCRIPTION DERM
LOCATION SIMPLE: POSTERIOR NECK
LOCATION SIMPLE: RIGHT LOWER BACK
LOCATION SIMPLE: LEFT FOREHEAD
LOCATION SIMPLE: CHEST
LOCATION SIMPLE: LEFT FOREARM
LOCATION SIMPLE: ABDOMEN
LOCATION SIMPLE: RIGHT FOREHEAD

## 2018-08-31 ASSESSMENT — LOCATION ZONE DERM
LOCATION ZONE: NECK
LOCATION ZONE: FACE
LOCATION ZONE: TRUNK
LOCATION ZONE: ARM

## 2018-08-31 ASSESSMENT — PAIN INTENSITY VAS: HOW INTENSE IS YOUR PAIN 0 BEING NO PAIN, 10 BEING THE MOST SEVERE PAIN POSSIBLE?: NO PAIN

## 2018-08-31 NOTE — PROCEDURE: LIQUID NITROGEN
Duration Of Freeze Thaw-Cycle (Seconds): 10
Number Of Freeze-Thaw Cycles: 2 freeze-thaw cycles
Consent: The patient's verbal consent was obtained including but not limited to risks of crusting, scabbing, blistering, scarring, darker or lighter pigmentary change, recurrence, incomplete removal and infection.
Render Post-Care Instructions In Note?: no
Post-Care Instructions: I reviewed with the patient in detail post-care instructions. Patient is to wear sunprotection, and avoid picking at any of the treated lesions. Pt may apply Vaseline to crusted or scabbing areas.
Detail Level: Simple

## 2019-01-08 ENCOUNTER — HOSPITAL ENCOUNTER (INPATIENT)
Dept: HOSPITAL 35 - SBH | Age: 84
LOS: 8 days | Discharge: NURSING, REHAB OR LONG-TERM CARE | DRG: 57 | End: 2019-01-16
Attending: PSYCHIATRY & NEUROLOGY | Admitting: PSYCHIATRY & NEUROLOGY
Payer: COMMERCIAL

## 2019-01-08 VITALS — HEIGHT: 73 IN | BODY MASS INDEX: 21.67 KG/M2 | WEIGHT: 163.5 LBS

## 2019-01-08 VITALS — DIASTOLIC BLOOD PRESSURE: 56 MMHG | SYSTOLIC BLOOD PRESSURE: 136 MMHG

## 2019-01-08 VITALS — DIASTOLIC BLOOD PRESSURE: 69 MMHG | SYSTOLIC BLOOD PRESSURE: 142 MMHG

## 2019-01-08 VITALS — SYSTOLIC BLOOD PRESSURE: 136 MMHG | DIASTOLIC BLOOD PRESSURE: 56 MMHG
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
## 2019-01-08 DIAGNOSIS — Z66: ICD-10-CM
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
## 2019-01-08 DIAGNOSIS — E78.5: ICD-10-CM

## 2019-01-08 DIAGNOSIS — R07.81: ICD-10-CM

## 2019-01-08 DIAGNOSIS — I10: ICD-10-CM

## 2019-01-08 DIAGNOSIS — F02.81: ICD-10-CM

## 2019-01-08 DIAGNOSIS — Z80.8: ICD-10-CM
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
## 2019-01-08 DIAGNOSIS — Z95.5: ICD-10-CM

## 2019-01-08 DIAGNOSIS — I25.10: ICD-10-CM

## 2019-01-08 DIAGNOSIS — Z90.49: ICD-10-CM
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
## 2019-01-08 DIAGNOSIS — G30.9: Primary | ICD-10-CM

## 2019-01-08 DIAGNOSIS — Z79.899: ICD-10-CM

## 2019-01-08 LAB
ALBUMIN SERPL-MCNC: 3.4 G/DL (ref 3.4–5)
ALT SERPL-CCNC: 38 U/L (ref 30–65)
ANION GAP SERPL CALC-SCNC: 3 MMOL/L (ref 7–16)
AST SERPL-CCNC: 36 U/L (ref 15–37)
BILIRUB SERPL-MCNC: 0.5 MG/DL
BUN SERPL-MCNC: 12 MG/DL (ref 7–18)
CALCIUM SERPL-MCNC: 8.6 MG/DL (ref 8.5–10.1)
CHLORIDE SERPL-SCNC: 103 MMOL/L (ref 98–107)
CO2 SERPL-SCNC: 32 MMOL/L (ref 21–32)
CREAT SERPL-MCNC: 1.3 MG/DL (ref 0.7–1.3)
ERYTHROCYTE [DISTWIDTH] IN BLOOD BY AUTOMATED COUNT: 13 % (ref 10.5–14.5)
GLUCOSE SERPL-MCNC: 165 MG/DL (ref 74–106)
HCT VFR BLD CALC: 43.6 % (ref 42–52)
HGB BLD-MCNC: 15.4 GM/DL (ref 14–18)
MCH RBC QN AUTO: 31.6 PG (ref 26–34)
MCHC RBC AUTO-ENTMCNC: 35.3 G/DL (ref 28–37)
MCV RBC: 89.6 FL (ref 80–100)
PLATELET # BLD: 158 THOU/UL (ref 150–400)
POTASSIUM SERPL-SCNC: 3.8 MMOL/L (ref 3.5–5.1)
PROT SERPL-MCNC: 6.6 G/DL (ref 6.4–8.2)
RBC # BLD AUTO: 4.86 MIL/UL (ref 4.5–6)
SODIUM SERPL-SCNC: 138 MMOL/L (ref 136–145)
WBC # BLD AUTO: 7.5 THOU/UL (ref 4–11)

## 2019-01-08 PROCEDURE — 10880: CPT

## 2019-01-08 NOTE — NUR
PATIENT ADMITTED TO ROOM 525, ORDERS DR. DA SILVA.  ADMITTED FOR PARANOIA,
DELUSIONS, HX OF ELOPEMENT.  REPORTEDLY BROKE THROUGH A WINDOW AT FACILITY
WHERE HE AND SPOUSE RESIDE, ESCAPED FROM FACILITY, FOUND AT A NEARBY CREEK,
NAKED.  UNKNOWN PERCIPITATING FACTORS.  DAUGHTER, RAFI, IS PATIENT'S DPOA, WHO
PRESENTED TO [facility] TO GIVE HISTORY INFORMATION TO DR. DA SILVA AND SIGN PATIENT
IN.  PATIENT IS A RETIRED STATE , AND A  PRIOR TO
HIS ROLE AS .  PATIENT ACCEPTING OF HOSPITALIZATION;
CALM AND COOPERATIVE WITH RN AND OTHER HEALTH CARE TEAM.  PLAN OF CARE
REVIEWED WITH PATIENT AND DAUGHTER.  MEDICATIONS ORDERED BY DR. DA SILVA; STAT
LABS AND UA ALSO ORDERED BY DR. DA SILVA.  NOTE:  PATIENT HAS FACIAL CUTS AND
BRUISING ON TYLOR HANDS, LEFT ARM R/T BREAKING AND RUNNING THROUGH GLASS.
PHOTOS TAKEN AND PLACED IN PATIENT'S CHART.  NURSE REPORTED INFORMATION TO
NIGHT NURSE.

## 2019-01-08 NOTE — D
[facility]
[address]
[place]   [address]                     DISCHARGE SUMMARY             
_______________________________________________________________________________
 
Name:       SHARRI VALDEZ               Room #:         518B[address]B      DIS IN  
M.R.#:      [id number]                       Account #:      [id number]  
Admission:  01/08/19    Attend Phys:    Morro Mahajan DO
Discharge:  01/16/19    Date of Birth:  [birth year]  
                                                          Report #: [id number]
                                                                    [id number]   
_______________________________________________________________________________
THIS REPORT FOR:   //name//                          
 
CC: Morro Cain
 
DATE OF SERVICE:  01/16/2019
 
 
ATTENDING PHYSICIAN:  Morro Mahajan DO.
 
MEDICAL CONSULTANTS:  Dr. Obrien, hospitalist at the time of discharge.
 
DISCHARGE DIAGNOSES:  As are follows:  Major neurocognitive disorder due to
Alzheimer disease with behavioral disturbance improved and hypertension.
 
DISCHARGE PLAN:  Discharged to the [facility]
facility.  Regular diet.  Activity level as tolerated.  24/7 supervision with
elopement precautions.  Dr. Cain will be his physician at the facility.
 
MEDICATIONS AT THE TIME OF DISCHARGE:  Depakote ER 1000 mg at bedtime, memantine
10 mg twice per day for cognitive enhancement, aspirin 81 mg daily for
cardioprotection, tamsulosin 0.4 mg daily for BPH, multivitamin oral daily,
atenolol 12.5 mg oral daily for hypertension, MiraLax 17 grams daily for bowel
motility.  P.r.n. medications will be per the receiving facility.
 
REASON FOR ADMISSION:  Elopement and agitation at nursing facility.
 
HOSPITAL COURSE:  The patient was admitted to Geriatric Psychiatry Unit.  He had
good behavior, started on Depakote 750 mg daily, blood level of 63, increased to
1000 daily.  Repeat blood level will be due in the middle of next week.  Started
on Zyprexa 2.5 mg q.12 hours.  He did not have any assaultiveness or elopement
attempts while he was here.
 
LABORATORIES AT THIS HOSPITAL:  CBC:  H and H 15.4 and ____, white count 7.5,
platelets 158,000.  Chemistry:  Sodium 138, potassium 3.8, chloride 103,
bicarbonate 32, anion gap 3, BUN 12, creatinine 1.3, estimated GFR 53, glucose
165, calcium 8.6, total bilirubin 0.5, AST 36, ALT 38, alkaline phosphatase 118,
total protein 6.6, albumin 3.4.  Urine was within normal limits.  There was no
imaging or procedures this admission.  Discharged today.
 
PHYSICAL EXAMINATION:  As follows:
VITAL SIGNS:  Heart rate 74, /84.
GENERAL:  Well-developed, well-nourished tall  male, appearing stated
age.
MENTAL STATUS:  Attention limited, concentration limited.  Speech is normal
rate, rhythm and tone.  Thought process linear and goal-directed.  Thought
 
 
 
[address]   [address]                     DISCHARGE SUMMARY             
_______________________________________________________________________________
 
Name:       SHARRI VALDEZ               Room #:         518B-B      DIS IN  
M.R.#:      [id number]                       Account #:      [id number]  
Admission:  01/08/19    Attend Phys:    Morro Mahajan DO
Discharge:  01/16/19    Date of Birth:  [birth year]  
                                                          Report #: [id number]
                                                                    [id number]   
_______________________________________________________________________________
content focused and no specific thing.  No psychomotor agitation or psychomotor
retardation.  Denied auditory, visual or tactile hallucination.  Denied suicidal
intent or plan.  Denied hopelessness or helplessness.  Denies homicidal intent
or plan.  Memory not formally tested, noted to be impaired.  Insight limited. 
Judgment limited.  Fund of knowledge not greater than average at this point.  
 
PROGNOSIS:  For this patient is guarded due to advancing major neurocognitive
disorder.  He was taken to the facility by [facility]. I did get a call from
____ [facility] to clarify the psychiatric medication.  He will not be
continued on quetiapine at all at that facility.  I did order p.r.n. olanzapine
for him.  I will be seeing him myself at the facility in the next 48 hours.
 
 
 
 
 
 
 
 
 
 
 
 
 
 
 
 
 
 
 
 
 
 
 
 
 
 
 
 
 
 
 
 
 
                         
   By:                               
                   
D: 01/16/19 1842                           _____________________________________
T: 01/16/19 2116                           Morro Mahajan,           /nt

## 2019-01-09 VITALS — SYSTOLIC BLOOD PRESSURE: 108 MMHG | DIASTOLIC BLOOD PRESSURE: 43 MMHG

## 2019-01-09 VITALS — SYSTOLIC BLOOD PRESSURE: 141 MMHG | DIASTOLIC BLOOD PRESSURE: 77 MMHG

## 2019-01-09 NOTE — H
[facility]
Hien Calix
[place]   [address]                     HISTORY AND PHYSICAL          
_______________________________________________________________________________
 
Name:       SHARRI BATISTA               Room #:         525A-A      ADM IN  
M.R.#:      [id number]                       Account #:      [id number]  
Admission:  19    Attend Phys:    Morro Mahajan DO
Discharge:              Date of Birth:  [birth year]  
                                                          Report #: [id number]
                                                                    [id number]   
_______________________________________________________________________________
THIS REPORT FOR:   //name//                          
 
CC: Morro Mahajan
    Ileana Cain
 
DATE OF SERVICE:  2019
 
 
ATTENDING PHYSICIAN:  Morro Mahajan DO
 
MEDICAL CONSULTANT:  Dr. Calix.
 
REFERRING FACILITY:  [facility].
 
SOURCES OF INFORMATION:  Interview with the patient, his daughter, Iman, as
well as his wife and primary DPOA Irene.  The patient will be a DNR.  
 
STATEMENT OF INCPAICITY AND ENACTMENT OF DPOA: Of note,
he has an advanced dementia and he is not oriented to time or place.  I believe
he is clearly delusional, so he lacks capacity to make healthcare or living
decisions, DPOA for healthcare and financial matters is now activated.
 
CHIEF COMPLAINT:  Unspecified.
 
HISTORY OF PRESENT ILLNESS:  This is an 84-year-old  male, residing at
the [facility] facility since
2018.  The patient has had a difficult 24 hours.  The patient evidently
broke out through a window from [facility] last night at some point, was found
near an assisted facility called [facility].  He had been exposed to the elements,
cut up a bit facially, wet.  The exact amount of time of exposures is unknown. 
He was taken to [facility] ER where he was checked out.  I do
not have lab results from there, but by report only significant finding was
hypokalemia, which was replaced.  He was sent back to [facility].  The staff there including the director of nursing, Davian Adam,
wanted to find a Geriatric Psychiatry hospitalization for him given the
exit-seeking behavior and also the inadequate pharmacotherapy regimen that the
patient is on.  While in the unit, the patient is pleasant and cooperative.  He
had a full meal, complied with staff in terms of getting out of his outside
clothes.  No aggressive or assaultive behaviors.  Per the patient's current
delusions, his wife is mentally ill, she needs to be hospitalized.  His wife and
his son, who is a [place] , are both considered triggers
for him.
 
PAST MEDICAL HISTORY:  Significant for several coronary stents.  Also, he has a
remote history of cholecystectomy.  No history of seizures.  No history of
substance use, tobacco, alcohol, cigarettes, so I suspect given his age he was a
 
 
 
[facility]
[address]
[place]   [address]                     HISTORY AND PHYSICAL          
_______________________________________________________________________________
 
Name:       SHARRI BATISTA               Room #:         525A-A      ADM IN  
M.R.#:      [id number]                       Account #:      [id number]  
Admission:  19    Attend Phys:    Morro Mahajan DO
Discharge:              Date of Birth:  [birth year]  
                                                          Report #: [id number]
                                                                    [id number]   
_______________________________________________________________________________
smoker at one time.
 
PAST PSYCHIATRIC HISTORY:  He did have admission at [facility] in [place], I believe, in December in Dr. King's service, apparently
he was kept 7 days.  According to the daughter, not much was done.  This was
prior to his admission at [facility].  In addition, the daughter reports a
6-year history of Alzheimer disease and only needing 24/7 supervision assistance
in the last year.
 
FAMILY HISTORY:  I believe the father  when he was 9 and the mother 
when he was in his 20s.  He is an only child.  Unclear their cause of death.  He
has 2 daughters and a son who are living and healthy as well as several
grandchildren.  He obtained a bachelor's degree and a master's degree in
journalism from [employer].  He had his career with
[employer] [employer], retiring in the early .  He did serve in the [employer] prior to the [employer], did not see combat, was stationed in
[place].  No history of suicide attempts.  No history of significant mental
illness in the family.
 
LABORATORY DATA:  CBC, CMP, urinalysis has been ordered.  Lab results from [facility] were not sent.
physical exam: 
 
Vital Sign Stable 
musculoskeletal normal gait and station
neuro: no dyskinetic movements
 
MENTAL STATUS EXAMINATION:  A well-developed, well-nourished  male,
thin, appearing stated age.  Attention limited, concentration limited.  Speech
is normal rate, rhythm and tone.  Thought process is linear.  No psychomotor
agitation, no psychomotor retardation.  Denied auditory, visual or tactile
hallucinations.  Denied suicidal intent or plan.  Denied hopelessness or
helplessness.  Denied homicidal intent or plan.  Memory noted to be impaired,
not formally tested, denied.  Insight limited.  Judgment limited.  Fund of
knowledge well below baseline.
 
ASSESSMENT:  An 84-year-old  male, admitted with recent break-out and
elopement behavior from memory care facility.
 
ADDITIONAL INFORMATION:  Medications from memory care are trazodone 50 mg at
bedtime for sleep, MiraLax 17 grams daily, senna 1 tab daily, Zofran 4 mg by
mouth every, I believe, 6 hours as needed; Seroquel one tablet, I believe, every
6 hours as needed; Seroquel 1 tablet orally 2 times a day, atenolol 12.5 mg
daily.  Additional information from [facility] by Dr. Bush dated 2018, he
has had an appendectomy, a fracture, coronary angioplasty.  Problems list
including Dupuytren contracture, heart disease, hyperlipidemia, hypertension,
 
 
 
[facility]
[address]
[place]   [address]                     HISTORY AND PHYSICAL          
_______________________________________________________________________________
 
Name:       SHARRI BATISTA               Room #:         525A-A      ADM IN  
..#:      [id number]                       Account #:      [id number]  
Admission:  19    Attend Phys:    Morro Mahajan DO
Discharge:              Date of Birth:  [birth year]  
                                                          Report #: [id number]
                                                                    [id number]   
_______________________________________________________________________________
medial epicondylitis.
 
Additional family history:  Coronary artery disease was negative, negative for
stroke.  His mother did have a cancer that was the cause of death.
 
REVIEW OF SYSTEMS:  A 10-point review of systems was not possible or reliable
given the patient's dementia.
 
PSYCHIATRIC REVIEW OF SYSTEMS:  Reports there is delusional thinking, he is
normally not oriented.  His wife, Irene Batista, is available at
[phone] and that is primarily her cell phone, home phone is [phone]. 
First daughter's name is Iman Sweet, phone number [phone].
 
Time spent on interview, review of records, coordination of care of this patient
is approximately 105 minutes, greater than 50% of the time was spent on
counseling and coordination of care.  Also, in the hospital right now,
medications are Depakote 750 mg at bedtime, Zyprexa 2.5 mg q.12 hours p.o., if
refuses force IM 2.5 mg.  I will hold off additional medications till I run on
the patient's MAR.
 
STRENGTHS: SUPPORTIVE FAMILY, INSURANCE
 
WEAKNESSES: ADVANCED DEMENTIA, ADVANCING AGE, 
 
 
 
 
 
 
 
 
 
 
 
 
 
 
 
 
 
 
 
 
 
  <ELECTRONICALLY SIGNED>
   By: Morro Mahajan DO        
  19
D: 19                           _____________________________________
T: 19                           Morro Mahajan DO          /nt

## 2019-01-09 NOTE — NUR
SEBASTIÁN spoke with Tomasa concerning her father care, and she stated that she
would like for the hospital to obtain previous hospilization. SW obtain a
release information document to assist with the hospital retrieving medical
history on pt. SEBASTIÁN explained that she will be meeting with the pt to complete
psychosocial assessment.

## 2019-01-09 NOTE — NUR
RECEIVED PT AT 1900. PLEASANT AND COOPERATIVE. OUTSPOKEN AS TO WHY HE DECIDED
TO LEAVE FACILITY HE WAS PREVIOUSLY. FELT TRAPPED IN A PLACE THAT "CONFUSED
ME". IT WAS LAID OUT LIKE A MAZE AND "I HAD TO GET OUT". VSS ALERT AND
ORIENTED. HS MEDS TAKEN W/O COMLPAINT. SLEPT WELL THROUGH THE NIGHT.

## 2019-01-09 NOTE — NUR
PATIENT WAS IN BED SLEEPING WHEN CARE ASSUMED. WHEN AWAKEN, PATIENT HAD
BREAKFAST, TOOK ALL MEDICATION WITHOUT DIFFICULTY. PATIENT DENIES SUICIDAL AND
HOMOCIDAL IDEATION. PATIENT IS CALM, COOPERATIVE, INTERACTING WELL WITH STAFF.
HAD A BATH TODAY BY OCCUPATIONAL THERAPIST, WELL TOLERATED.  AFFECT IS BRIGHT,
MOOD IS DEPRESSED, HAD EPISODE OF TEARFULNESS WHEN DAUGHTER VISITED.  PATIENT
STATES " I JUST WANT YOU TO KNOW THAT I AM NOT CRAZY".  PATIENT HAS BEEN UP
WALKING AROUND THE UNIT. DAUGHTER STATES PATIENT LIKES TO WALK. NO AGITATION
OR AGGRESSIVE BEHAVIOR NOTED AT THIS TIME, WILL MONITOR FOR SAFETY.

## 2019-01-10 VITALS — SYSTOLIC BLOOD PRESSURE: 107 MMHG | DIASTOLIC BLOOD PRESSURE: 58 MMHG

## 2019-01-10 VITALS — DIASTOLIC BLOOD PRESSURE: 56 MMHG | SYSTOLIC BLOOD PRESSURE: 100 MMHG

## 2019-01-10 VITALS — DIASTOLIC BLOOD PRESSURE: 47 MMHG | SYSTOLIC BLOOD PRESSURE: 108 MMHG

## 2019-01-10 VITALS — DIASTOLIC BLOOD PRESSURE: 70 MMHG | SYSTOLIC BLOOD PRESSURE: 159 MMHG

## 2019-01-10 NOTE — NUR
SEBASTIÁN left a voicemail for Tomasa concerning setting an family therapy session
on January 14, 2019. SEBASTIÁN asked if the daughter could return the call.

## 2019-01-10 NOTE — NUR
NEW ORDERS PER DR. DA SILVA FOR THE FOLLOWING:
IF PT. REFUSES 9 PM PO MED OF OLANZAPINE, GIVE IM OLANZAPINE 2.5 MG IM.;
CHANGE VITAL SIGNS FROM BID TO TID.  TAKE ORTHOSTATIC B/P EVERY DAY.  SITTING
AND STANDING ONLY.  TO WAIT AT LEAST 3 MINUTES BEFORE TAKING STANDING B/P;
IF HE CONTINUES TO GO TO THE DOOR GIVE OLANZAPINE PO 5 MG Q 6 HOURS;  IF HIS
BEHAVIOR ESCULATES NOTIFY DR. BOUCHER.

## 2019-01-10 NOTE — NUR
PT WAS C/O NAUSEA D/T HIS MEDICATIONS FROM THIS A.M. GAVE ZOFRAN AND HE TOLD
MANY STORIES, LIKES TO BE CALLED 'FISH', TOLD STORIES ABOUT THE [employer] AND
[employer] AND WORKING ON THE PATROL. ALSO STATES HE IS HERE BY ACCIDENT AND
HIS DAUGHTERS WILL 'SPRING HIM' FROM HERE IN A FEW DAYS. ENCOURAGED HIM TO
WRITE HIS STORIES DOWN AND WILL CONTINUE TO MONITOR

## 2019-01-11 VITALS — SYSTOLIC BLOOD PRESSURE: 146 MMHG | DIASTOLIC BLOOD PRESSURE: 62 MMHG

## 2019-01-11 NOTE — NUR
PATIENTS CARES WERE ASSUMED AT SHIFT CHANGE. PATIENT WAS ASSESSED AND MEDS
WERE PASSED. HE DISPLAYED A FEW EPISODES OF HALLUCINATION. HE ASKED FOR A SIX-
THIRTY WAKE UP CALL DUE TO HIS WIFE WILL HAVE SURGERY IN THE MORNING. ROUNDING
WAS DONE. THE BED IS IN A LOW AND LOCKED POSITION.

## 2019-01-11 NOTE — NUR
ASSUMED PT CARE AT 0715, PT A&OX4 THOUGH CONFUSED AT TIMES. WALKED TO DINNER
ROOM FOR BREAKFAST WITH GOOD APPETITE. UA WAS NOT COLLECTED PER LAB WILL
RECOLLECT. PT IS VERY PLEASNT.

## 2019-01-11 NOTE — NUR
SEBASTIÁN met with Tomasa the daughter pt concerning the upcoming family meeting on
January 14, 2019. SEBASTIÁN explained that the pt is not rationale able to
distinguished that he suffering from dementia. SEBASTIÁN explained that he still
living in past life. SEBASTIÁN stated that he is triggered by his son, that he does
not have his best interest in heart. SEBASTIÁN explained that this sometimes caused
by the loss of feeling that he does not have any control. Tomasa mention that
have tried to repair the relationship between her brother and father but
nothing has worked. SEBASTIÁN stated that she will continue to work with pt through
therapy.

## 2019-01-11 NOTE — NUR
SEBASTIÁN met with pt and Oli Schulte and Annie for a family thearpy session to
discuss DPOA, and psychosocial assessment. SEBASTIÁN explained that the pt has been
participating an individual and group therapy session. While participating in
theses assessment SW is noticing that there is some cognitive delays that is
hindering pt to comprehend. SEBASTIÁN explained that pt may need a more appropriate
placement such as memory care unit. SEBASTIÁN discussed have the family came together
to finalized a living will to devices the pt wishes if she was to become
unable too. SEBASTIÁN completed CBT. SW assisted family with how understand dementia,
and coping skills that assist with bonding with the pt. The children wanted to
know what is the next step concerning her care. SEBASTIÁN explained that Dr. Mahajan
will follow-up with the family concerning his assessment, and follow-up with
Eris. SEBASTIÁN mention that we will have another family meeting to finalize the
best care for the pt. SEBASTIÁN asked if the home was suitable for the pt to stay.
The family stated that the home is not suitable, and there mom needs more care
and Eris is unable to provide due to him working. SEBASTIÁN will follow-up with
the family for another session on next week.

## 2019-01-12 VITALS — DIASTOLIC BLOOD PRESSURE: 82 MMHG | SYSTOLIC BLOOD PRESSURE: 109 MMHG

## 2019-01-12 VITALS — DIASTOLIC BLOOD PRESSURE: 75 MMHG | SYSTOLIC BLOOD PRESSURE: 152 MMHG

## 2019-01-12 VITALS — SYSTOLIC BLOOD PRESSURE: 109 MMHG | DIASTOLIC BLOOD PRESSURE: 82 MMHG

## 2019-01-12 LAB
BILIRUB UR-MCNC: NEGATIVE MG/DL
COLOR UR: YELLOW
KETONES UR STRIP-MCNC: NEGATIVE MG/DL
NITRITE UR QL STRIP: NEGATIVE
RBC # UR STRIP: NEGATIVE /UL
SP GR UR STRIP: 1.02 (ref 1–1.03)
URINE CLARITY: CLEAR
URINE GLUCOSE-RANDOM*: NEGATIVE
URINE LEUKOCYTES: NEGATIVE
URINE PROTEIN (DIPSTICK): NEGATIVE
UROBILINOGEN UR STRIP-ACNC: 0.2 E.U./DL (ref 0.2–1)

## 2019-01-12 NOTE — NUR
TOWARDS POC
PT INTERACTING APPROPRIATELY, VSS, AFEBRILE. CALM, COOPERATIVE. PT REMAINED
UP AD PALMA. WILL CONTINUE TO MONITOR.

## 2019-01-12 NOTE — NUR
PT WAS OBSERVED SITTING IN THE TV AREA WATCHING TV AT THE START OF SHIFT.PT
DENIED PAIN SO FAR.NO DELUSIONS OR AGGRESSIVE BEHAVIOR NOTED SO FAR.PT TOOK
ALL HIS HS MED OKAY.PT VERY PLEASANT AND COOPERATIVE WITH CARE.INTERACTED WITH
OTHER PTS AND STAFF WELL.PT UP ADLIB IN THE UNIT.UA OBTAINED AND SENT DOWN TO
THE LAB.PT SLEEPING ON HIS BED AT THIS TIME,BREATHING NORMAL AND UNLAOBORED.NO
BM TODAY.FREQUENT CHECKS IN PLACE.

## 2019-01-12 NOTE — NUR
Pt participated in the "Coping with Fears" group at 1300.  Pt was fully
engaged and participated in all activities.  Pt displayed a high level of
resilences during his time in the group.  He expressed positive adaptation to
diffrent fears he has faced in life, " Its a part of life,
you just have to get through and move on, you cant let it stop you from doing
the things you have to do".
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
time.  Pt also talked about how he deals with the fear of memory loss, " We
all face getting older and I don't remember alot of things, so I write
everything down on an index card and put it next to my meal in the morning or
under my pillow, that way I can keep track of what I need to do".  Pt was able
to process how he is able to handle his fears and how he faces his fears.  Pt
was open and helpful during the group.

## 2019-01-13 VITALS — DIASTOLIC BLOOD PRESSURE: 40 MMHG | SYSTOLIC BLOOD PRESSURE: 99 MMHG

## 2019-01-13 VITALS — SYSTOLIC BLOOD PRESSURE: 99 MMHG | DIASTOLIC BLOOD PRESSURE: 40 MMHG

## 2019-01-13 VITALS — DIASTOLIC BLOOD PRESSURE: 53 MMHG | SYSTOLIC BLOOD PRESSURE: 125 MMHG

## 2019-01-13 NOTE — NUR
Pt thanked me fkor everything.  He stated his wife will be up today at 5:00
p.m. to take him home.  He was wanting me to met her.  He agreed to watch the
playoff game with me this afternoon.  He has an appropriate affect with a
pleasant mood. Parkinson's disease

## 2019-01-13 NOTE — NUR
ASSUMED CARE AT 0700. PT ALERT TO SELF AND PLACE. PT IS VERY CALM AND
COOPERATIVE THIS AM. PT CAME TO DAY ROOM AND ATE BREAKFAST AND WAS SOCIAL. PT
TOOK AM MEDICATION. PT NOW WALKING HALLS, STATES HE IS GETTING HIS EXERCISE.

## 2019-01-13 NOTE — NUR
PT GETTING HIS"ROADWORK IN", PACING THE HALLWAY EARLY IN 7PM SHIFT. TOOK HS
MEDS AS PRESCRIBED. MENTIONED TO RN THAT HE WAS PLANNING TO "GO TO [facility]
TOMORROW." UNCERTAIN IF HE MEANT [facility] [place]. WOKE THIS AM ASKING AT 3AM IF
WE HAD SOME SORT OF ACTIVITY GOING ON. SOME CONFUSION NOTED, BUT RESPONDED TO
REDIRECTION BY RETURNING TO BED AND SLEEP.

## 2019-01-14 VITALS — DIASTOLIC BLOOD PRESSURE: 48 MMHG | SYSTOLIC BLOOD PRESSURE: 143 MMHG

## 2019-01-14 VITALS — DIASTOLIC BLOOD PRESSURE: 77 MMHG | SYSTOLIC BLOOD PRESSURE: 105 MMHG

## 2019-01-14 NOTE — NUR
CLIENT WALKS HALLS FOR DAILY EXERCISE, A&0X4, AND APPEARS TO HAVE TAKEN
ANOTHER CLIENT UNDER HIS WING, WATCHING OUT FOR HER. BROUGHT ME A FORM SEBASTIÁN HAD
GIVEN HER TO MERELY LOOK OVER, HE WANTED TO KNOW WHAT ADVICE HE SHOULD GIVE
HER. ASKED ADVICE OF MANAGER AND SHE ASSURED SW WOULD WORK WITH HER AND FAMILY
LATER  HE IS A&0X4 YET THEN WILL MENTION THAT HES NOT SUPPOSED TO BE HERE AND
HIS FAMILY WILL 'SPRING HIM' EVENTUALLY

## 2019-01-14 NOTE — NUR
ASSUMED CARE MID SHIFT FOR PT D/T RN NEEDING TO LEAVE. A&0X4, MEDS GIVEN YET
RN REPORTS HAVING DIFFICULTY WITH SCANNER, HAD RX UP HERE TO ASSIST. LET PT
KNOW I'D BE CARING FOR HIM AND HE INTRO'D ME TO HIS DAUGHTER AND SANG STAFF'S
PRAISES. WILL CONTINUE TO MONITOR

## 2019-01-14 NOTE — NUR
PT OUT WITH OTHER PATIENTS THIS PM, WATCHING TV AND INTERACTING APPROPRIATLY.
TOOK HS MEDS AS PRESCRIBED. UP SOCIALIZING WITH FEMALE PT UNTIL 2200, THEN
WENT TO BED. UP ONE TIME DURING THE NIGHT, UNCERTAIN OF THE TIME. RETURNED TO
HIS ROOM AND SLEPT THROUGHT THE NIGHT.

## 2019-01-14 NOTE — NUR
ASSUMED CARE OF THE PATIENT AT 1915 PM.  ALERT ET ORIENTED X 3.  MAKES NEEDS
KNOWN.  DENIES SI, HI, AND A/V HALLUNICATIONS.  DENIES RACING THOUGHTS AND
NIGHTMARES.  THE PATIENT HAS BEEN PACING THE HALLWAY EARLIER IN THE SHIFT.
REMAINS ON 12 MINUTE CHECKS FOR HIS SAFETY.

## 2019-01-14 NOTE — NUR
PATIENT UP ATE LUNCH AND BREAKFAST.  CALM AND COOPERATIVE.  FAMILY CONFERENCE
WITH DOCTOR.  FAMILY VISITED PATIENT.

## 2019-01-15 VITALS — DIASTOLIC BLOOD PRESSURE: 84 MMHG | SYSTOLIC BLOOD PRESSURE: 117 MMHG

## 2019-01-15 VITALS — DIASTOLIC BLOOD PRESSURE: 33 MMHG | SYSTOLIC BLOOD PRESSURE: 155 MMHG

## 2019-01-15 NOTE — NUR
END OF SHIFT. DAUGHTER CAME TO VISIT DURING DAY. NO CHANGE IN PT CONDITION.
STATES APPRECIATION OF STAFF AND UNIT. POC TO DISCHARGE TO [facility]
TOMORROW.

## 2019-01-15 NOTE — NUR
SEBASTIÁN called in left a voicemail for the DON of [facility]
concerning the dicharhe of pt on tomorrow, January 16, 2019. SEBASTIÁN requested a
call back.

## 2019-01-15 NOTE — NUR
PT CALM, IN BED IN AND OUT, CURRENTLY IN THE DINING ROOM,  WAS RESTING EARLIER
BUT THE OVERHEAD ANNOUNCEMENT STIRRED HIM UP ASKING "HAS SOMEONE RESPONDED TO
THAT EMERGENCY",  ABLE TO REASSURE PATIENT NOT TO WORRY ABOUT IT,  GIVEN SNACK
TONIGHT, NO TOILETING SELF,  NO SOB NOTED, MONITORED.

## 2019-01-15 NOTE — NUR
THE PT SLEPT 7.6 HOURS LAST NIGHT.  THE PT WAS UP AND ABOUT THE UNIT EARLIER
IN THE SHIFT, THEN HE WENT TO BED WITHOUT ANY DIFFICULTY.

## 2019-01-15 NOTE — NUR
ASSESSMENT COMPLETED. ALERT, ORIENTED TO SELF, SITUATION, AND PLACE.
FORGETFULNESS AND CONFUSION NOTED AT TIMES. DOES NOT APPEAR TO BE IN ANY
DISTRESS OR SHAN. CALM AND APPROPRIATE BEHAVIOR NOTED. DENIES PAIN AND N/V/D.
REGULAR HEART SOUNDS. CLEAR LUNG SOUNDS. ACTIVE BOWEL SOUNDS, LAST BM 1/14.
SKIN INTACT. BRUISING ON HANDS/ARMS AND SCRATCHES ON NOSE NOTED. PT DENIES
CONCERNS AT THIS TIME.

## 2019-01-15 NOTE — NUR
Date of Admission: 01/08/19
Date of Activity Therapy Assessment: 1/11/19
Activity Goal: Increase reality orientation. Increase awareness of leisure
coping skills to aid in frustration.
Initial Goal: 1 Group activity/day
Weekly progress towards goal: On track
Group participation level: Full
Behaviors observed: Pacing hallways - exercising, lack of reality oreientation,
strong relationship with fellow pt
Plan: No change towards goal

## 2019-01-15 NOTE — NUR
ASSUMED CARE OF PT AT 0700. AM MEDS GIVEN AS ORDERED, PO PILLS SWALLOWED WHOLE
WITH WATER, NO PROBLEMS NOTED. CONSUMED 95% OF BREAKFAST, GOOD APPETITE NOTED.
PT SITTING IN THE DAYROOM.

## 2019-01-16 NOTE — NUR
ASSUMED CARE AT 0700 THIS A.M.  PT. ON UNIT THIS MORNING INTERACTING WITH
STAFF AND PEERS.  ATTENDED OT THIS MORNING.  D/C ORDER WRITTEN BY DR. DA SILVA.  ALL BELONGINGS GATHERED, D/C TEACHING COMPLETED ABOUT
MEDICATIONS,AND
D/C DIAGNOSIS.  ALL BELONGINGS, AND DISCHARGE INSTRUCTIONS SENT WITH PATIENT
WITH TRANSPORTATION.  REPORT GIVEN TO [facility] IN [place]S
[place] TO [place]

## 2019-01-16 NOTE — NUR
DISCHARGE & AFTERCARE PLAN
 
Patient name: SHARRI BATISTA
Date of Admission: 01/08/19
Date & Time of Discharge: January 16, 2018 at 10:00 am
 
Discharged to: Nursing facility
Name of Placement: [facility]
Address: [address] [place] [address]. [place] [address]
Phone: [phone]
Placement contact: Director of Nursing
Via:
Accompanied by: [facility]
 
Symptoms that may indicate need for re-assessment: Pt need to reassessed for
memory care unit
 
If any of the above symptoms re-occur, call the patient's outpatient
Psychiatrist listed below or call Crisis Line at:
 
FOLLOW-UP CARE: Pt need to follow-up with the facility medical and
psychiatrist doctor.
Psychiatrist: Dr. Mahajan    Date: 1/17/19
Address: [address] [place] [address] [place] [address]
Appointment Date: 1/19/19      Time: TBD
 
Therapist:        Date:
Address:           Time:
Appointment Date:
 
Community Clinic/Primary Care/Other Medical Referral:
Care Coordinator:      Phone:
Address:
Appointment Date:
 
Primary Care Clinic:     Phone:
Address:
Appointment Date:
Reason for referral:
 
[facility]/OPS/[facility] :    Phone:
Address:
Care Coordinator:        Start Date:
Payee:         Phone:
12 step/CD Program:    Phone:
Substance Use/Tobacco Treatment:
 Outpatient Counseling Appointment Date:   Time:
 Phone/Fax/email:
*Quit line, [facility], Internet structured programs based on patient's needs.
 
Other:    Phone:
Appointment Date:    Time:
 
 Psychotropic medication will continue to be titrated to signs, symptoms and
behaviors, in an attempt to transition and taper to effective monotherapy.
 
Diet on Discharge: Regular
 
Legally Authorized Representative: DPOA
 Name: Carole Batista      Phone #: [phone]     Fax:
 
Other Community Resources/Referrals:
 :   Phone #:
 
Goals following DC: Pt receieved medication management, and will continue
receieving psychiatrist services. Pt will be discharge to memory care unit at
[facility] in [place].
 
Additional instructions attached:
 
Name, Relationship, Phone/Fax of Designee(s) to receive copy of aftercare plan:
Pt DPOA receievied discharge over the phone
 
The above person(s) is/are authorized to receive a copy of this aftercare
plan. This aftercare plan has been planned & reviewed with me, and I
understand the recommendations of this aftercare plan.
 
 I decline to designate another person to receive a copy of this plan.
 
Staff will contact you after discharge via phone call to follow up on post
discharge treatment and status of your participations in care after discharge.
 
________________________________________     _________________________
       Patient Signature                             Date
 
________________________________________     _________________________
  Legally Authorized Representative                   Date
 
________________________________________     _________________________
     /                       Date
 
________________________________________      ________________________
         Registered Nurse                             Date

## 2020-08-13 ENCOUNTER — HOSPITAL ENCOUNTER (EMERGENCY)
Dept: HOSPITAL 96 - M.ERS | Age: 85
Discharge: HOME | End: 2020-08-13
Payer: MEDICARE

## 2020-08-13 VITALS — BODY MASS INDEX: 23.86 KG/M2 | WEIGHT: 180.01 LBS | HEIGHT: 73 IN

## 2020-08-13 VITALS — SYSTOLIC BLOOD PRESSURE: 120 MMHG | DIASTOLIC BLOOD PRESSURE: 85 MMHG
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
## 2020-08-13 DIAGNOSIS — N40.0: ICD-10-CM

## 2020-08-13 DIAGNOSIS — I10: ICD-10-CM
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
## 2020-08-13 DIAGNOSIS — M79.604: Primary | ICD-10-CM

## 2020-08-13 LAB
ABSOLUTE BASOPHILS: 0.1 THOU/UL (ref 0–0.2)
ABSOLUTE EOSINOPHILS: 0.4 THOU/UL (ref 0–0.7)
ABSOLUTE MONOCYTES: 0.5 THOU/UL (ref 0–1.2)
ALBUMIN SERPL-MCNC: 3.4 G/DL (ref 3.4–5)
ALP SERPL-CCNC: 179 U/L (ref 46–116)
ALT SERPL-CCNC: 23 U/L (ref 30–65)
ANION GAP SERPL CALC-SCNC: 8 MMOL/L (ref 7–16)
AST SERPL-CCNC: 22 U/L (ref 15–37)
BASOPHILS NFR BLD AUTO: 1.2 %
BILIRUB SERPL-MCNC: 0.7 MG/DL
BILIRUB UR-MCNC: NEGATIVE MG/DL
BUN SERPL-MCNC: 14 MG/DL (ref 7–18)
CALCIUM SERPL-MCNC: 8.8 MG/DL (ref 8.5–10.1)
CHLORIDE SERPL-SCNC: 104 MMOL/L (ref 98–107)
CO2 SERPL-SCNC: 28 MMOL/L (ref 21–32)
COLOR UR: YELLOW
CREAT SERPL-MCNC: 1.1 MG/DL (ref 0.6–1.3)
EOSINOPHIL NFR BLD: 4.4 %
GLUCOSE SERPL-MCNC: 107 MG/DL (ref 70–99)
GRANULOCYTES NFR BLD MANUAL: 65.7 %
HCT VFR BLD CALC: 42.9 % (ref 42–52)
HGB BLD-MCNC: 15 GM/DL (ref 14–18)
KETONES UR STRIP-MCNC: (no result) MG/DL
LYMPHOCYTES # BLD: 1.8 THOU/UL (ref 0.8–5.3)
LYMPHOCYTES NFR BLD AUTO: 22.2 %
MCH RBC QN AUTO: 30.8 PG (ref 26–34)
MCHC RBC AUTO-ENTMCNC: 35 G/DL (ref 28–37)
MCV RBC: 87.8 FL (ref 80–100)
MONOCYTES NFR BLD: 6.5 %
MPV: 8.4 FL. (ref 7.2–11.1)
NEUTROPHILS # BLD: 5.3 THOU/UL (ref 1.6–8.1)
NUCLEATED RBCS: 0 /100WBC
PLATELET COUNT*: 149 THOU/UL (ref 150–400)
POTASSIUM SERPL-SCNC: 3.7 MMOL/L (ref 3.5–5.1)
PROT SERPL-MCNC: 6.8 G/DL (ref 6.4–8.2)
PROT UR QL STRIP: NEGATIVE
RBC # BLD AUTO: 4.89 MIL/UL (ref 4.5–6)
RBC # UR STRIP: NEGATIVE /UL
RDW-CV: 13.6 % (ref 10.5–14.5)
SODIUM SERPL-SCNC: 140 MMOL/L (ref 136–145)
SP GR UR STRIP: 1.02 (ref 1–1.03)
URINE CLARITY: CLEAR
URINE GLUCOSE-RANDOM: NEGATIVE
URINE LEUKOCYTES-REFLEX: NEGATIVE
URINE NITRITE-REFLEX: NEGATIVE
UROBILINOGEN UR STRIP-ACNC: 0.2 E.U./DL (ref 0.2–1)
WBC # BLD AUTO: 8 THOU/UL (ref 4–11)

## 2020-08-13 NOTE — EKG
[place] [address]
Phone:  [phone]                     ELECTROCARDIOGRAM REPORT      
_______________________________________________________________________________
 
Name:         JOSÉ MIGUELSHARRI               Room:                     REG ER 
M.R.#:    [id number]     Account #:     [id number]  
Admission:    20    Attend Phys:                     
Discharge:                Date of Birth: [birth year]  
Date of Service: 20 1339  Report #:      [id number]
        [id number]
_______________________________________________________________________________
THIS REPORT FOR:  //name//                      
 
                         [facility] ED
                                       
Test Date:    2020               Test Time:    13:39:18
Pat Name:     SHARRI VALDEZ          Department:   
Patient ID:   [id number]            Room:          
Gender:                               Technician:   [facility]
:          1934               Requested By: Alexsandra Ledezma
Order Number: [id number] MD:   Tristan Alexander
                                 Measurements
Intervals                              Axis          
Rate:         67                       P:            71
RI:           182                      QRS:          12
QRSD:         104                      T:            43
QT:           416                                    
QTc:          439                                    
                           Interpretive Statements
Sinus rhythm
Anterior infarct, old
Baseline wander in lead(s) V1
No previous ECG available for comparison
Electronically Signed On 2020 15:05:03 CDT by Tristan Alexander
[email or web address]
 
 
 
 
 
 
 
 
 
 
 
 
 
 
 
 
 
 
 
 
  <ELECTRONICALLY SIGNED>
                                           By: Tristan Alexander MD, [facility]      
  20     1505
D: 20 1339   _____________________________________
T: 20 1339   Tristan Alexander MD, [facility]        /EPI